# Patient Record
Sex: MALE | Race: WHITE | URBAN - METROPOLITAN AREA
[De-identification: names, ages, dates, MRNs, and addresses within clinical notes are randomized per-mention and may not be internally consistent; named-entity substitution may affect disease eponyms.]

---
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
## 2023-01-03 ENCOUNTER — OFFICE VISIT (OUTPATIENT)
Dept: PEDIATRIC ENDOCRINOLOGY | Age: 18
End: 2023-01-03
Payer: COMMERCIAL

## 2023-01-03 VITALS
RESPIRATION RATE: 18 BRPM | HEIGHT: 73 IN | DIASTOLIC BLOOD PRESSURE: 77 MMHG | TEMPERATURE: 97.7 F | BODY MASS INDEX: 25.87 KG/M2 | HEART RATE: 81 BPM | WEIGHT: 195.2 LBS | SYSTOLIC BLOOD PRESSURE: 127 MMHG | OXYGEN SATURATION: 97 %

## 2023-01-03 DIAGNOSIS — E55.9 VITAMIN D INSUFFICIENCY: ICD-10-CM

## 2023-01-03 DIAGNOSIS — E10.9 TYPE 1 DIABETES MELLITUS WITHOUT COMPLICATION (HCC): Primary | ICD-10-CM

## 2023-01-03 DIAGNOSIS — R53.83 FATIGUE, UNSPECIFIED TYPE: ICD-10-CM

## 2023-01-03 LAB — HBA1C MFR BLD HPLC: 7.2 %

## 2023-01-03 PROCEDURE — 3051F HG A1C>EQUAL 7.0%<8.0%: CPT | Performed by: STUDENT IN AN ORGANIZED HEALTH CARE EDUCATION/TRAINING PROGRAM

## 2023-01-03 PROCEDURE — 83036 HEMOGLOBIN GLYCOSYLATED A1C: CPT | Performed by: STUDENT IN AN ORGANIZED HEALTH CARE EDUCATION/TRAINING PROGRAM

## 2023-01-03 PROCEDURE — 99215 OFFICE O/P EST HI 40 MIN: CPT | Performed by: STUDENT IN AN ORGANIZED HEALTH CARE EDUCATION/TRAINING PROGRAM

## 2023-01-03 NOTE — PROGRESS NOTES
Chief Complaint   Patient presents with    Follow-up    Thyroid Problem    Diabetes     Patient stated that he has been feeling very fatigue, loss of appetite and upset GI- wondering if thyroid related. Patient stated he has been feeling down nearly everyday of the past 2 weeks- patient does see outside therapy services. Informed patient of services and support we offer here in the office, if needed.

## 2023-01-03 NOTE — LETTER
1/3/2023    Patient: Gutierrez Alba   YOB: 2005   Date of Visit: 1/3/2023     Dieter Barron MD  [address] [place] [address]  Via Fax: [phone]    Dear Dieter Barron MD,      Thank you for referring Mr. Chapin Martinez to [address] [place] [address] for evaluation. My notes for this consultation are attached. Chief Complaint   Patient presents with    Follow-up    Thyroid Problem    Diabetes     Patient stated that he has been feeling very fatigue, loss of appetite and upset GI- wondering if thyroid related. Patient stated he has been feeling down nearly everyday of the past 2 weeks- patient does see outside therapy services. Informed patient of services and support we offer here in the office, if needed. Type 1  DM on T slim insulin pump here for follow up  Also has hypothyroidism   Vitamin D deficiency    History of present illness:    Traci Garcia is a 21 y.o. male with with type 1 DM here for follow up  . He was present today with his parents. Traci Garcia was originally diagnosed with diabetes in 2/2018 when he presented in DKA to [facility] He was initially followed at [facility]. Hba1c at diagnosis was 11.1%. He was diagnosed with hypothyroidism on 4/30/18 with TSH of 11.41,freeT4 of 0.93. He was started on levothyroxine 44mcg daily. He is also s/p cholecalciferol 50,000units for vitamin D deficiency. Started T slim insulin pump on 5/7/2018. Last clinic visit was 9/30/2022 and Hba1c was 7.5 %. Reports tiredness, constipation and occasional diarrhea. Also reports low mood for which he is currently seeing an outside therapist.  Sees a therapist once a week. Aside these, he has been well with no ER visit, major illness or admission in the hospital. Had zero episodes of positive urine ketones.  Denies headache,tiredness, problems with peripheral vision,constipation/diarrhea,heat/cold intolerance,polyuria,polydipsia        He is on the Tslim and DEXCOM G6.     BG average for past 2weeks: 197. Time in range: 57 %, high: 42 %, below target: 1 %. See scanned chart.  Hypoglycemia : None recently    Insulin regimen:    Insulin regimen:  Basal                        12am 1.5                        4am   1.3                       7am 1.45                        4pm   1.4                        8pm 1.5  Total basal: 34.6units     Bolus                        Target 12am 110 mg/dl                                              4am 110 mg/dl                                              12pm 110 mg/dl                                                 8pm: 110                           ISF                 12am 1: 30                                              4am 1:30                                              12pm 1:30                                                8pm 1:30                           ICR                12am 1: 20                                              4am  1:12                                              7am 1:10                                                4pm  1:10                                                8pm  1:10        Hypothyroidism:   Most recent thyroid studies:  Office Visit on 01/03/2023   Component Date Value Ref Range Status    Hemoglobin A1c (POC) 01/03/2023 7.2  % Final   Orders Only on 10/12/2022   Component Date Value Ref Range Status    T4, Free 11/23/2022 0.86 (A)  0.93 - 1.60 ng/dL Final    TSH 11/23/2022 19.100 (A)  0.450 - 4.500 uIU/mL Final   Office Visit on 09/30/2022   Component Date Value Ref Range Status    Hemoglobin A1c (POC) 09/30/2022 7.5  % Final    T4, Free 09/30/2022 0.98  0.93 - 1.60 ng/dL Final    TSH 09/30/2022 6.150 (A)  0.450 - 4.500 uIU/mL Final    VITAMIN D, 25-HYDROXY 09/30/2022 24.3 (A)  30.0 - 100.0 ng/mL Final    Comment: Vitamin D deficiency has been defined by the [place] of  Medicine and an Endocrine Society practice guideline as a  level of serum 25-OH vitamin D less than 20 ng/mL (1,2). The Endocrine Society went on to further define vitamin D  insufficiency as a level between 21 and 29 ng/mL (2). 1. IOM ([place] of Medicine). 2010. Dietary reference     intakes for calcium and D. [facility]: The     [employer] CONS[employer]MENT. 2. Edvin MF, Bethel MCKEON, Vicky PULIDO, et al.     Evaluation, treatment, and prevention of vitamin D     deficiency: an Endocrine Society clinical practice     guideline. JCEM. 2011 Jul; 96(7):1911-30.  Creatinine, urine random 09/30/2022 107.6  Not Estab. mg/dL Final    Microalbumin, urine 09/30/2022 <3.0  Not Estab. ug/mL Final    Microalb/Creat ratio (ug/mg creat.) 09/30/2022 <3  0 - 29 mg/g creat Final    Comment:                        Normal:                0 -  29                         Moderately increased: 30 - 300                         Severely increased:       >300          Most recent labs done in October 2022 came back of elevated TSH with low free T4. Levothyroxine dose was increased from 112 mg daily to 125 mcg daily. Last Eye exam: Reports he had eye exam done recently    Last flu shot: Received in clinic [9/30/2022]    Medical ID: Wearing today    Received Covid vaccine. Screening labs:    Social History:  Second year of college at [employer]  Activities: soccer(goalie)    Review of systems:  12 point ROS completed by me and is negative except as indicated above in HPI    Medications:  Current Outpatient Medications   Medication Sig    levothyroxine (SYNTHROID) 125 mcg tablet Take 1 Tablet by mouth Daily (before breakfast). Do not take with calcium, iron or soy    insulin lispro (HumaLOG U-100 Insulin) 100 unit/mL injection INFUSE VIA INSULIN PUMP UP  UNITS DAILY.     Dexcom G6 Sensor will TO CHECK BLOOD SUGAR, CHANGE EVERY 7 DAYS 90-DAY SUPPLY    Dexcom G6 Transmitter wlil TO BE USED TO CHECK BLOOD SUGARS WITH DEXCOM SENSORS CHANGE EVERY 90 DAYS    glucagon (Baqsimi) 3 mg/actuation nasal spray BAQSIMI : two pack-3mg. Spray one device(3mg) in one nostril for severe hypoglycemia, LOC, seizures. Please label both packs.  OneTouch Verio test strips strip Test blood sugar up to 6x daily    Ketostix strip CHECK URINE FOR KETONES IF BLOOD SUGAR CONSISTENTLY ABOVE 300. ONE FOR HOME, ONE FOR SCHOOL.  insulin syringe-needle U-100 (BD INSULIN SYRINGE ULTRA-FINE) 0.3 mL 31 gauge x 15/64\" syrg 1-10 Units by Does Not Apply route six (6) times daily.  ONE TOUCH DELICA 33 gauge misc USE TO CHECK BLOOD SUGAR BEFORE MEALS, AT BEDTIME AND AS NEEDED    JADE PEN NEEDLE 32 gauge x 5/32\" ndle USE TO ADMINSTER INSULIN UP TO 4-5 TIMES PER DAY    insulin glargine (BASAGLAR KWIKPEN U-100 INSULIN) 100 unit/mL (3 mL) inpn Use as directed upto 50units daily (Patient not taking: No sig reported)     No current facility-administered medications for this visit. Allergies:  No Known Allergies        Objective:       Visit Vitals  /77 (BP 1 Location: Right arm, BP Patient Position: Sitting)   Pulse 81   Temp 97.7 °F (36.5 °C) (Temporal)   Resp 18   Ht 6' 0.76\" (1.848 m)   Wt 195 lb 3.2 oz (88.5 kg)   SpO2 97%   BMI 25.93 kg/m²     Blood pressure percentiles are not available for patients who are 18 years or older. Weight: 91 %ile (Z= 1.33) based on CDC (Boys, 2-20 Years) weight-for-age data using vitals from 1/3/2023. Height: 87 %ile (Z= 1.14) based on CDC (Boys, 2-20 Years) Stature-for-age data based on Stature recorded on 1/3/2023. BMI: Body mass index is 25.93 kg/m². Percentile: 82 %ile (Z= 0.90) based on CDC (Boys, 2-20 Years) BMI-for-age based on BMI available as of 1/3/2023. Change in weight: +2.7 kg in the last 3 months  Change in height: Relatively unchanged in the last 3 months    In general, Nico is alert, well-appearing and in no acute distress.   Oropharynx is clear, mucous membranes moist. Neck is supple without lymphadenopathy. Thyroid is smooth and not enlarged. Abdomen is soft, nontender, nondistended, no hepatosplenomegaly. Skin is warm and well perfused. Infusion sites:  clear without evidence of lipohypertrophy. Neuro demonstrates normal tone and strength throughout. Sexual development: stage [adult]    Laboratory data:  No components found for: [id number]  Recent Results (from the past 12 hour(s))   AMB POC HEMOGLOBIN A1C    Collection Time: 01/03/23 11:03 AM   Result Value Ref Range    Hemoglobin A1c (POC) 7.2 %         Office Visit on 01/03/2023   Component Date Value Ref Range Status    Hemoglobin A1c (POC) 01/03/2023 7.2  % Final   Orders Only on 10/12/2022   Component Date Value Ref Range Status    T4, Free 11/23/2022 0.86 (A)  0.93 - 1.60 ng/dL Final    TSH 11/23/2022 19.100 (A)  0.450 - 4.500 uIU/mL Final   Office Visit on 09/30/2022   Component Date Value Ref Range Status    Hemoglobin A1c (POC) 09/30/2022 7.5  % Final    T4, Free 09/30/2022 0.98  0.93 - 1.60 ng/dL Final    TSH 09/30/2022 6.150 (A)  0.450 - 4.500 uIU/mL Final    VITAMIN D, 25-HYDROXY 09/30/2022 24.3 (A)  30.0 - 100.0 ng/mL Final    Comment: Vitamin D deficiency has been defined by the [place] of  Medicine and an Endocrine Society practice guideline as a  level of serum 25-OH vitamin D less than 20 ng/mL (1,2). The Endocrine Society went on to further define vitamin D  insufficiency as a level between 21 and 29 ng/mL (2). 1. IOM ([place] of Medicine). 2010. Dietary reference     intakes for calcium and D. [facility]: The     [employer]. 2. Edvin MF, Bethel MCKEON, Vicky PULIDO, et al.     Evaluation, treatment, and prevention of vitamin D     deficiency: an Endocrine Society clinical practice     guideline. JCEM. 2011 Jul; 96(7):1911-30.       Creatinine, urine random 09/30/2022 107.6  Not Estab. mg/dL Final    Microalbumin, urine 09/30/2022 <3.0  Not Estab. ug/mL Final    Microalb/Creat ratio (ug/mg creat.) 09/30/2022 <3  0 - 29 mg/g creat Final    Comment:                        Normal:                0 -  29                         Moderately increased: 30 - 300                         Severely increased:       >300               Assessment:       Yusuf Garza is a 21 y.o. male presenting with recent diagnosis of type 1 diabetes under excellent control. Hba1c today 7.2 %, decreased from last clinic visit and within the ADA target of <7.5%. Started T-slim insulin pump on 5/7/2018. Using control IQ. BG averages improving and almost within target. No insulin dose changes today. Below is changing his pump site every 5 days. Stressed importance of changing pump site at least every 3 days to improve the effectiveness of insulin as well as decrease the risk of infection. Send me records in 2 weeks to review for any further insulin dose adjustment. Let me know sooner if he is having lows. Hypothyroidism: Most recent thyroid studies done in October 2022 came back of elevated TSH with low free T4. Levothyroxine dose was increased from 112 mcg daily to 125 mg daily. Reports tiredness, constipation and diarrhea. We will send repeat thyroid labs in a week together with a.m. cortisol, celiac screen and vitamin D level. We will give family a call to discuss the results as well as further management plan. Meantime continue levothyroxine 125 mcg daily. Low mood: Continue follow-up with outside therapist.    Yearly screening labs done in June 2022 came back with lipid panel significant for elevated total cholesterol, LDL and triglycerides. Stressed the importance of making dietary and lifestyle changes. Reduce sugary drinks, reduce carbs, reduce chips and cookies, increase vegetables, increase activity. Plan will be to repeat lipid panel in 3 months or sooner if any concerns. Medical ID recommended at all times.        Plan:   Reviewed growth charts and labs with family  Reviewed hypoglycemia and how to manage hypoglycemia including when to use glucagon (for severe hypoglycemia, LOC,seizure)  Reviewed ketones check and how to management positve ketones with family  Hemoglobin A1C reviewed. Correlation between A1C and long term complications like neuropathy, nephropathy and retinopathy reviewed. Acute complications like diabetes ketoacidosis and dehydration and electrolyte abnormalities discussed  Return to clinic in 3 months or sooner if any concerns    Patient Instructions   Seen for follow for type 1 diabetes. HbA1c today is 7.2 %. Target is <7.5%. Plan  Send us records in a week to review for any insulin dose adjustements  Review checking ketones when vomiting, 2 consecutive blood glucose above 350,  illness  When trace or small drink more water and keep checking until negative.  If moderate or large give us a call #[phone]  Target before activity >120, if below get something with carbs,protein and fat (granula bar)      Yearly eye exams are recommended after you have had diabetes for 3-5 years  Dental exams every 6 months are recommended  Flu vaccine is recommended every year, as early in the season as possible  Medical ID should be worn at all times  Continue rotating injection/insertion sites  Annual labs are due: 6/2023          Insulin regimen:  Basal                        12am 1.5                        4am   1.3                       7am 1.45                        4pm   1.4                        8pm 1.5  Total basal: 34.6units     Bolus                        Target 12am 110 mg/dl                                              4am 110 mg/dl                                              12pm 110 mg/dl                                                 8pm: 110                           ISF                 12am 1: 30                                              4am 1:30                                              12pm 1:30 8pm 1:30                           ICR                12am 1: 20                                              4am  1:12                                              7am 1:10                                                4pm  1:10                                                8pm  1:10     - Take Levothyroxine 125 mcg daily  - Take levothyroxine on an empty stomach if possible, about 30 minutes or more prior to breakfast or 2-3 hours after a meal  - Do not take levothyroxine with other medications such as vitamins, iron or calcium supplements as iron, calcium and soy can interfere with absorption of levothyroxine.  - If [facility] misses a dose of levothyroxine, it can be made up by taking it later in the day or by taking 2 doses the following day. - Repeat TSH and Free T4 today and in 3 months. - Return to endocrine clinic in 3 months.  - Call us sooner if [facility] has symptoms of tremor, persistently rapid heart beat, diarrhea, feeling too warm all the time, difficulty sleeping or difficulty concentrating as these can be symptoms of too much thyroid hormone and we may want to repeat labs sooner than the next scheduled time. - Thyroid hormone needs often increase with time as children grow, gain weight, and go through puberty, so dose may need to change over time.   Orders Placed This Encounter    CORTISOL, URINE FREE 24 HR     Standing Status:   Future     Number of Occurrences:   1     Standing Expiration Date:   1/3/2024    VITAMIN D, 25 HYDROXY     Standing Status:   Future     Number of Occurrences:   1     Standing Expiration Date:   1/3/2024    REFERRAL TO OPHTHALMOLOGY     Referral Priority:   Routine     Referral Type:   Consultation     Referral Reason:   Specialty Services Required     Number of Visits Requested:   1    AMB POC HEMOGLOBIN A1C         Total time: 40minutes  Time spent counseling patient/family: 50%    Parts of these notes were done by Dragon dictation and may be subject to inadvertent grammatical errors due to issues of voice recognition. Mariely Horner MD        [facility] Encounter:      Met with mother and Olinda Public. Home from [address] [place] [address], returns 1/25/23. Doing well with diabetes management. Concerns regarding thyroid management to be discussed with Dr Tan Arroyo. A lot of basal surges on CIQ Tandem. Time of sleep scheduled changed to [id number]. He is not changing the site every 3 days, changing every 3-6 days. Asked that he complete full change. He does take off pump for 4 hour at a time with soccer. Does suspend to prevent lows and eats to prevent lows. Asked that he send us data after being back to school for 3-4 weeks for review and support. Dr Yudi Nassar updated. If you have questions, please do not hesitate to call me. I look forward to following your patient along with you.       Sincerely,    Mariely Horner MD

## 2023-01-03 NOTE — PATIENT INSTRUCTIONS
Seen for follow for type 1 diabetes. HbA1c today is 7.2 %. Target is <7.5%. Plan  Send us records in a week to review for any insulin dose adjustements  Review checking ketones when vomiting, 2 consecutive blood glucose above 350,  illness  When trace or small drink more water and keep checking until negative.  If moderate or large give us a call #[phone]  Target before activity >120, if below get something with carbs,protein and fat (granula bar)      Yearly eye exams are recommended after you have had diabetes for 3-5 years  Dental exams every 6 months are recommended  Flu vaccine is recommended every year, as early in the season as possible  Medical ID should be worn at all times  Continue rotating injection/insertion sites  Annual labs are due: 6/2023          Insulin regimen:  Basal                        12am 1.5                        4am   1.3                       7am 1.45                        4pm   1.4                        8pm 1.5  Total basal: 34.6units     Bolus                        Target 12am 110 mg/dl                                              4am 110 mg/dl                                              12pm 110 mg/dl                                                 8pm: 110                           ISF                 12am 1: 30                                              4am 1:30                                              12pm 1:30                                                8pm 1:30                           ICR                12am 1: 20                                              4am  1:12                                              7am 1:10                                                4pm  1:10                                                8pm  1:10     - Take Levothyroxine 125 mcg daily  - Take levothyroxine on an empty stomach if possible, about 30 minutes or more prior to breakfast or 2-3 hours after a meal  - Do not take levothyroxine with other medications such as vitamins, iron or calcium supplements as iron, calcium and soy can interfere with absorption of levothyroxine.  - If [facility] misses a dose of levothyroxine, it can be made up by taking it later in the day or by taking 2 doses the following day. - Repeat TSH and Free T4 today and in 3 months. - Return to endocrine clinic in 3 months.  - Call us sooner if [facility] has symptoms of tremor, persistently rapid heart beat, diarrhea, feeling too warm all the time, difficulty sleeping or difficulty concentrating as these can be symptoms of too much thyroid hormone and we may want to repeat labs sooner than the next scheduled time. - Thyroid hormone needs often increase with time as children grow, gain weight, and go through puberty, so dose may need to change over time.

## 2023-01-03 NOTE — PROGRESS NOTES
[facility] Encounter:      Met with mother and Annemariechay Noonan. Home from [address] [place] [address], returns 1/25/23. Doing well with diabetes management. Concerns regarding thyroid management to be discussed with Dr Carlos Zavala. A lot of basal surges on CIQ Tandem. Time of sleep scheduled changed to [id number]. He is not changing the site every 3 days, changing every 3-6 days. Asked that he complete full change. He does take off pump for 4 hour at a time with soccer. Does suspend to prevent lows and eats to prevent lows. Asked that he send us data after being back to school for 3-4 weeks for review and support. Dr Sabina Riddle updated.

## 2023-01-03 NOTE — PROGRESS NOTES
Type 1  DM on T slim insulin pump here for follow up  Also has hypothyroidism   Vitamin D deficiency    History of present illness:    Cherrie Hernández is a 21 y.o. male with with type 1 DM here for follow up  . He was present today with his parents. Cherrie Hernández was originally diagnosed with diabetes in 2/2018 when he presented in DKA to [facility] He was initially followed at [facility]. Hba1c at diagnosis was 11.1%. He was diagnosed with hypothyroidism on 4/30/18 with TSH of 11.41,freeT4 of 0.93. He was started on levothyroxine 44mcg daily. He is also s/p cholecalciferol 50,000units for vitamin D deficiency. Started T slim insulin pump on 5/7/2018. Last clinic visit was 9/30/2022 and Hba1c was 7.5 %. Reports tiredness, constipation and occasional diarrhea. Also reports low mood for which he is currently seeing an outside therapist.  Sees a therapist once a week. Aside these, he has been well with no ER visit, major illness or admission in the hospital. Had zero episodes of positive urine ketones. Denies headache,tiredness, problems with peripheral vision,constipation/diarrhea,heat/cold intolerance,polyuria,polydipsia        He is on the Tslim and DEXCOM G6.     BG average for past 2weeks: 197. Time in range: 57 %, high: 42 %, below target: 1 %. See scanned chart.  Hypoglycemia : None recently    Insulin regimen:    Insulin regimen:  Basal                        12am 1.5                        4am   1.3                       7am 1.45                        4pm   1.4                        8pm 1.5  Total basal: 34.6units     Bolus                        Target 12am 110 mg/dl                                              4am 110 mg/dl                                              12pm 110 mg/dl                                                 8pm: 110                           ISF                 12am 1: 30                                              4am 1:30 12pm 1:30                                                8pm 1:30                           ICR                12am 1: 20                                              4am  1:12                                              7am 1:10                                                4pm  1:10                                                8pm  1:10        Hypothyroidism:   Most recent thyroid studies:  Office Visit on 01/03/2023   Component Date Value Ref Range Status    Hemoglobin A1c (POC) 01/03/2023 7.2  % Final   Orders Only on 10/12/2022   Component Date Value Ref Range Status    T4, Free 11/23/2022 0.86 (A)  0.93 - 1.60 ng/dL Final    TSH 11/23/2022 19.100 (A)  0.450 - 4.500 uIU/mL Final   Office Visit on 09/30/2022   Component Date Value Ref Range Status    Hemoglobin A1c (POC) 09/30/2022 7.5  % Final    T4, Free 09/30/2022 0.98  0.93 - 1.60 ng/dL Final    TSH 09/30/2022 6.150 (A)  0.450 - 4.500 uIU/mL Final    VITAMIN D, 25-HYDROXY 09/30/2022 24.3 (A)  30.0 - 100.0 ng/mL Final    Comment: Vitamin D deficiency has been defined by the [address] practice guideline as a  level of serum 25-OH vitamin D less than 20 ng/mL (1,2). The Endocrine Society went on to further define vitamin D  insufficiency as a level between 21 and 29 ng/mL (2). 1. IOM ([place] of Medicine). 2010. Dietary reference     intakes for calcium and D. [address] [facility]: The     Ontuitive. 2. Edvin MF, Bethel MCKEON, Vicky PULIDO, et al.     Evaluation, treatment, and prevention of vitamin D     deficiency: an Endocrine Society clinical practice     guideline. JCEM. 2011 Jul; 96(7):1911-30.       Creatinine, urine random 09/30/2022 107.6  Not Estab. mg/dL Final    Microalbumin, urine 09/30/2022 <3.0  Not Estab. ug/mL Final    Microalb/Creat ratio (ug/mg creat.) 09/30/2022 <3  0 - 29 mg/g creat Final    Comment:                        Normal:                0 -  29 Moderately increased: 30 - 300                         Severely increased:       >300          Most recent labs done in October 2022 came back of elevated TSH with low free T4. Levothyroxine dose was increased from 112 mg daily to 125 mcg daily. Last Eye exam: Reports he had eye exam done recently    Last flu shot: Received in clinic [9/30/2022]    Medical ID: Wearing today    Received Covid vaccine. Screening labs:    Social History:  Second year of college at [employer]  Activities: soccer(goalie)    Review of systems:  12 point ROS completed by me and is negative except as indicated above in HPI    Medications:  Current Outpatient Medications   Medication Sig    levothyroxine (SYNTHROID) 125 mcg tablet Take 1 Tablet by mouth Daily (before breakfast). Do not take with calcium, iron or soy    insulin lispro (HumaLOG U-100 Insulin) 100 unit/mL injection INFUSE VIA INSULIN PUMP UP  UNITS DAILY. Dexcom G6 Sensor will TO CHECK BLOOD SUGAR, CHANGE EVERY 7 DAYS 90-DAY SUPPLY    Dexcom G6 Transmitter will TO BE USED TO CHECK BLOOD SUGARS WITH DEXCOM SENSORS CHANGE EVERY 90 DAYS    glucagon (Baqsimi) 3 mg/actuation nasal spray BAQSIMI : two pack-3mg. Spray one device(3mg) in one nostril for severe hypoglycemia, LOC, seizures. Please label both packs. OneTouch Verio test strips strip Test blood sugar up to 6x daily    Ketostix strip CHECK URINE FOR KETONES IF BLOOD SUGAR CONSISTENTLY ABOVE 300. ONE FOR HOME, ONE FOR SCHOOL. insulin syringe-needle U-100 (BD INSULIN SYRINGE ULTRA-FINE) 0.3 mL 31 gauge x 15/64\" syrg 1-10 Units by Does Not Apply route six (6) times daily.     ONE TOUCH DELICA 33 gauge misc USE TO CHECK BLOOD SUGAR BEFORE MEALS, AT BEDTIME AND AS NEEDED    JADE PEN NEEDLE 32 gauge x 5/32\" ndle USE TO ADMINSTER INSULIN UP TO 4-5 TIMES PER DAY    insulin glargine (BASAGLAR KWIKPEN U-100 INSULIN) 100 unit/mL (3 mL) inpn Use as directed upto 50units daily (Patient not taking: No sig reported)     No current facility-administered medications for this visit. Allergies:  No Known Allergies        Objective:       Visit Vitals  /77 (BP 1 Location: Right arm, BP Patient Position: Sitting)   Pulse 81   Temp 97.7 °F (36.5 °C) (Temporal)   Resp 18   Ht 6' 0.76\" (1.848 m)   Wt 195 lb 3.2 oz (88.5 kg)   SpO2 97%   BMI 25.93 kg/m²     Blood pressure percentiles are not available for patients who are 18 years or older. Weight: 91 %ile (Z= 1.33) based on CDC (Boys, 2-20 Years) weight-for-age data using vitals from 1/3/2023. Height: 87 %ile (Z= 1.14) based on CDC (Boys, 2-20 Years) Stature-for-age data based on Stature recorded on 1/3/2023. BMI: Body mass index is 25.93 kg/m². Percentile: 82 %ile (Z= 0.90) based on CDC (Boys, 2-20 Years) BMI-for-age based on BMI available as of 1/3/2023. Change in weight: +2.7 kg in the last 3 months  Change in height: Relatively unchanged in the last 3 months    In general, Nico is alert, well-appearing and in no acute distress. Oropharynx is clear, mucous membranes moist. Neck is supple without lymphadenopathy. Thyroid is smooth and not enlarged. Abdomen is soft, nontender, nondistended, no hepatosplenomegaly. Skin is warm and well perfused. Infusion sites:  clear without evidence of lipohypertrophy. Neuro demonstrates normal tone and strength throughout.  Sexual development: stage [adult]    Laboratory data:  No components found for: [id number]  Recent Results (from the past 12 hour(s))   AMB POC HEMOGLOBIN A1C    Collection Time: 01/03/23 11:03 AM   Result Value Ref Range    Hemoglobin A1c (POC) 7.2 %         Office Visit on 01/03/2023   Component Date Value Ref Range Status    Hemoglobin A1c (POC) 01/03/2023 7.2  % Final   Orders Only on 10/12/2022   Component Date Value Ref Range Status    T4, Free 11/23/2022 0.86 (A)  0.93 - 1.60 ng/dL Final    TSH 11/23/2022 19.100 (A)  0.450 - 4.500 uIU/mL Final   Office Visit on 09/30/2022 Component Date Value Ref Range Status    Hemoglobin A1c (POC) 09/30/2022 7.5  % Final    T4, Free 09/30/2022 0.98  0.93 - 1.60 ng/dL Final    TSH 09/30/2022 6.150 (A)  0.450 - 4.500 uIU/mL Final    VITAMIN D, 25-HYDROXY 09/30/2022 24.3 (A)  30.0 - 100.0 ng/mL Final    Comment: Vitamin D deficiency has been defined by the [address] practice guideline as a  level of serum 25-OH vitamin D less than 20 ng/mL (1,2). The Endocrine Society went on to further define vitamin D  insufficiency as a level between 21 and 29 ng/mL (2). 1. IOM ([place] of Medicine). 2010. Dietary reference     intakes for calcium and D. [address] [facility]: The     [email or web address]. 2. Edvin MF, Bethel NC, Vicky PULIDO, et al.     Evaluation, treatment, and prevention of vitamin D     deficiency: an Endocrine Society clinical practice     guideline. JCEM. 2011 Jul; 96(7):1911-30. Creatinine, urine random 09/30/2022 107.6  Not Estab. mg/dL Final    Microalbumin, urine 09/30/2022 <3.0  Not Estab. ug/mL Final    Microalb/Creat ratio (ug/mg creat.) 09/30/2022 <3  0 - 29 mg/g creat Final    Comment:                        Normal:                0 -  29                         Moderately increased: 30 - 300                         Severely increased:       >300               Assessment:       Elisha Lafleur is a 21 y.o. male presenting with recent diagnosis of type 1 diabetes under excellent control. Hba1c today 7.2 %, decreased from last clinic visit and within the ADA target of <7.5%. Started T-slim insulin pump on 5/7/2018. Using control IQ. BG averages improving and almost within target. No insulin dose changes today. Below is changing his pump site every 5 days. Stressed importance of changing pump site at least every 3 days to improve the effectiveness of insulin as well as decrease the risk of infection. Send me records in 2 weeks to review for any further insulin dose adjustment.  Let me know sooner if he is having lows. Hypothyroidism: Most recent thyroid studies done in October 2022 came back of elevated TSH with low free T4. Levothyroxine dose was increased from 112 mcg daily to 125 mg daily. Reports tiredness, constipation and diarrhea. We will send repeat thyroid labs in a week together with a.m. cortisol, celiac screen and vitamin D level. We will give family a call to discuss the results as well as further management plan. Meantime continue levothyroxine 125 mcg daily. Low mood: Continue follow-up with outside therapist.    Yearly screening labs done in June 2022 came back with lipid panel significant for elevated total cholesterol, LDL and triglycerides. Stressed the importance of making dietary and lifestyle changes. Reduce sugary drinks, reduce carbs, reduce chips and cookies, increase vegetables, increase activity. Plan will be to repeat lipid panel in 3 months or sooner if any concerns. Medical ID recommended at all times. Plan:   Reviewed growth charts and labs with family  Reviewed hypoglycemia and how to manage hypoglycemia including when to use glucagon (for severe hypoglycemia, LOC,seizure)  Reviewed ketones check and how to management positve ketones with family  Hemoglobin A1C reviewed. Correlation between A1C and long term complications like neuropathy, nephropathy and retinopathy reviewed. Acute complications like diabetes ketoacidosis and dehydration and electrolyte abnormalities discussed  Return to clinic in 3 months or sooner if any concerns    Patient Instructions   Seen for follow for type 1 diabetes. HbA1c today is 7.2 %. Target is <7.5%. Plan  Send us records in a week to review for any insulin dose adjustements  Review checking ketones when vomiting, 2 consecutive blood glucose above 350,  illness  When trace or small drink more water and keep checking until negative.  If moderate or large give us a call #[phone]  Target before activity >120, if below get something with carbs,protein and fat (granula bar)      Yearly eye exams are recommended after you have had diabetes for 3-5 years  Dental exams every 6 months are recommended  Flu vaccine is recommended every year, as early in the season as possible  Medical ID should be worn at all times  Continue rotating injection/insertion sites  Annual labs are due: 6/2023          Insulin regimen:  Basal                        12am 1.5                        4am   1.3                       7am 1.45                        4pm   1.4                        8pm 1.5  Total basal: 34.6units     Bolus                        Target 12am 110 mg/dl                                              4am 110 mg/dl                                              12pm 110 mg/dl                                                 8pm: 110                           ISF                 12am 1: 30                                              4am 1:30                                              12pm 1:30                                                8pm 1:30                           ICR                12am 1: 20                                              4am  1:12                                              7am 1:10                                                4pm  1:10                                                8pm  1:10     - Take Levothyroxine 125 mcg daily  - Take levothyroxine on an empty stomach if possible, about 30 minutes or more prior to breakfast or 2-3 hours after a meal  - Do not take levothyroxine with other medications such as vitamins, iron or calcium supplements as iron, calcium and soy can interfere with absorption of levothyroxine.  - If [facility] misses a dose of levothyroxine, it can be made up by taking it later in the day or by taking 2 doses the following day. - Repeat TSH and Free T4 today and in 3 months.   - Return to endocrine clinic in 3 months.  - Call us sooner if Cherrie Hernández has symptoms of tremor, persistently rapid heart beat, diarrhea, feeling too warm all the time, difficulty sleeping or difficulty concentrating as these can be symptoms of too much thyroid hormone and we may want to repeat labs sooner than the next scheduled time. - Thyroid hormone needs often increase with time as children grow, gain weight, and go through puberty, so dose may need to change over time. Orders Placed This Encounter    CORTISOL, URINE FREE 24 HR     Standing Status:   Future     Number of Occurrences:   1     Standing Expiration Date:   1/3/2024    VITAMIN D, 25 HYDROXY     Standing Status:   Future     Number of Occurrences:   1     Standing Expiration Date:   1/3/2024    REFERRAL TO OPHTHALMOLOGY     Referral Priority:   Routine     Referral Type:   Consultation     Referral Reason:   Specialty Services Required     Number of Visits Requested:   1    AMB POC HEMOGLOBIN A1C         Total time: 40minutes  Time spent counseling patient/family: 50%    Parts of these notes were done by Dragon dictation and may be subject to inadvertent grammatical errors due to issues of voice recognition.     Joceline Momin MD

## 2023-01-18 NOTE — PATIENT INSTRUCTIONS
Seen for follow for type 1 diabetes.  HbA1c today is 7.7 %. Target is <7.5%.         Plan  Send us records in a week to review for any insulin dose adjustements  Review checking ketones when vomiting, 2 consecutive blood glucose above 350,  illness  When trace or small drink more water and keep checking until negative.  If moderate or large give us a call #[phone]  Target before activity >120, if below get something with carbs,protein and fat (granula bar)      Yearly eye exams are recommended after you have had diabetes for 3-5 years  Dental exams every 6 months are recommended  Flu vaccine is recommended every year, as early in the season as possible  Medical ID should be worn at all times  Continue rotating injection/insertion sites  Annual labs are due: 6/2023        New insulin regimen    Insulin regimen:  Basal                        12am 1.3                        4am   1.3                                               12pm  1.35                        4pm   1.3                        8pm 1.3  Total basal: 31.7units     Bolus                        Target 12am 110 mg/dl                                              4am 110 mg/dl                                              12pm 110 mg/dl                                                 8pm: 110                           ISF                 12am 1:50                                              4am 1:30                                              12pm 1:30                                                8pm 1:30                           ICR                12am 1:40                                              4am  1:12                                              7am 1:10                                                12pm  1:10                                                8pm  1:15                         - Take Levothyroxine 112 mcg daily  - Take levothyroxine on an empty stomach if possible, about 30 minutes or more prior to breakfast or 2-3 hours after a meal  - Do not take levothyroxine with other medications such as vitamins, iron or calcium supplements as iron, calcium and soy can interfere with absorption of levothyroxine.  - If Nico misses a dose of levothyroxine, it can be made up by taking it later in the day or by taking 2 doses the following day. - Repeat TSH and Free T4 in 3 months. - Return to endocrine clinic in 3 months.  - Call us sooner if Nico has symptoms of tremor, persistently rapid heart beat, diarrhea, feeling too warm all the time, difficulty sleeping or difficulty concentrating as these can be symptoms of too much thyroid hormone and we may want to repeat labs sooner than the next scheduled time.   - Thyroid hormone needs often increase with time as children grow, gain weight, and go through puberty, so dose may need to change over time.        caffeine

## 2023-02-27 DIAGNOSIS — E03.9 HYPOTHYROIDISM (ACQUIRED): ICD-10-CM

## 2023-02-27 RX ORDER — LEVOTHYROXINE SODIUM 125 UG/1
TABLET ORAL
Qty: 90 TABLET | Refills: 0 | Status: SHIPPED | OUTPATIENT
Start: 2023-02-27

## 2023-06-25 ENCOUNTER — TELEPHONE (OUTPATIENT)
Age: 18
End: 2023-06-25

## 2023-06-25 DIAGNOSIS — E10.9 TYPE 1 DIABETES MELLITUS WITHOUT COMPLICATION (HCC): Primary | ICD-10-CM

## 2023-06-25 RX ORDER — INSULIN DEGLUDEC INJECTION 100 U/ML
INJECTION, SOLUTION SUBCUTANEOUS
Qty: 15 ML | Refills: 1 | Status: SHIPPED | OUTPATIENT
Start: 2023-06-25

## 2023-06-25 RX ORDER — INSULIN LISPRO-AABC 100 [IU]/ML
INJECTION, SOLUTION SUBCUTANEOUS
Qty: 15 ML | Refills: 1 | Status: SHIPPED | OUTPATIENT
Start: 2023-06-25

## 2023-07-10 RX ORDER — LEVOTHYROXINE SODIUM 0.12 MG/1
TABLET ORAL
Qty: 30 TABLET | Refills: 1 | Status: SHIPPED | OUTPATIENT
Start: 2023-07-10

## 2023-07-17 ENCOUNTER — OFFICE VISIT (OUTPATIENT)
Age: 18
End: 2023-07-17
Payer: COMMERCIAL

## 2023-07-17 VITALS
RESPIRATION RATE: 19 BRPM | HEART RATE: 84 BPM | DIASTOLIC BLOOD PRESSURE: 74 MMHG | WEIGHT: 196.38 LBS | SYSTOLIC BLOOD PRESSURE: 124 MMHG | HEIGHT: 73 IN | BODY MASS INDEX: 26.03 KG/M2 | OXYGEN SATURATION: 97 %

## 2023-07-17 DIAGNOSIS — E10.9 TYPE 1 DIABETES MELLITUS WITHOUT COMPLICATIONS (HCC): Primary | ICD-10-CM

## 2023-07-17 DIAGNOSIS — E03.9 HYPOTHYROIDISM (ACQUIRED): ICD-10-CM

## 2023-07-17 DIAGNOSIS — E10.9 TYPE 1 DIABETES MELLITUS WITHOUT COMPLICATION (HCC): ICD-10-CM

## 2023-07-17 DIAGNOSIS — E55.9 VITAMIN D INSUFFICIENCY: ICD-10-CM

## 2023-07-17 LAB — HBA1C MFR BLD: 7.1 %

## 2023-07-17 PROCEDURE — 99215 OFFICE O/P EST HI 40 MIN: CPT | Performed by: STUDENT IN AN ORGANIZED HEALTH CARE EDUCATION/TRAINING PROGRAM

## 2023-07-17 PROCEDURE — 83036 HEMOGLOBIN GLYCOSYLATED A1C: CPT | Performed by: STUDENT IN AN ORGANIZED HEALTH CARE EDUCATION/TRAINING PROGRAM

## 2023-07-17 RX ORDER — LEVOTHYROXINE SODIUM 0.12 MG/1
TABLET ORAL
Qty: 90 TABLET | Refills: 3 | Status: SHIPPED | OUTPATIENT
Start: 2023-07-17

## 2023-07-17 ASSESSMENT — PATIENT HEALTH QUESTIONNAIRE - PHQ9
SUM OF ALL RESPONSES TO PHQ QUESTIONS 1-9: 0
SUM OF ALL RESPONSES TO PHQ9 QUESTIONS 1 & 2: 0
2. FEELING DOWN, DEPRESSED OR HOPELESS: 0
1. LITTLE INTEREST OR PLEASURE IN DOING THINGS: 0
SUM OF ALL RESPONSES TO PHQ QUESTIONS 1-9: 0

## 2023-07-17 NOTE — PROGRESS NOTES
CDCES Encounter with Suma Hull for follow up of Type 1 Diabetes. Accompanied today by mom . Complete insulin delivery via: Tandem Control IQ  Insights from device download: Dexcom G6   Time in Range (  mg/dl): 55  TDD: 66      Schedule is different during summer, reports some days not doing well with entries. Discussed exercise mode and sleep for > 5 hours intervals. Forgot transmitter in [place], discussed cash pay options and [employer].        Hemoglobin A1C, POC   Date Value Ref Range Status   07/17/2023 7.1 % Final   04/14/2023 6.5 % Final   01/03/2023 7.2 % Final     Hemoglobin A1C   Date Value Ref Range Status   06/15/2022 7.8 (H) 4.8 - 5.6 % Final     Comment:              Prediabetes: 5.7 - 6.4           Diabetes: >6.4           Glycemic control for adults with diabetes: <7.0     07/08/2020 6.4 (H) 4.8 - 5.6 % Final     Comment:              Prediabetes: 5.7 - 6.4           Diabetes: >6.4           Glycemic control for adults with diabetes: <7.0              MIGUEL LONGO RN, Ama Rossi
Identified patient with two patient identifiers- name and . Reviewed record in preparation for visit and have obtained necessary documentation. Chief Complaint   Patient presents with    Diabetes        There were no vitals taken for this visit.
Insulin Lispro-aabc, 1 U Dial, (VANDANAUMKRISTANV KWIKPEN) 100 UNIT/ML SOPN Use as directed subcutaneous upto 100units daily(for pump failure)    Glucagon (BAQSIMI ONE PACK) 3 MG/DOSE POWD BAQSIMI : two pack-3mg. Spray one device(3mg) in one nostril for severe hypoglycemia, LOC, seizures. Please label both packs. insulin lispro (HUMALOG) 100 UNIT/ML SOLN injection vial INFUSE VIA INSULIN PUMP UP  UNITS DAILY. No current facility-administered medications for this visit. Allergies:  No Known Allergies          Objective:       Ht Readings from Last 3 Encounters:   07/17/23 6' 1.31\" (1.862 m) (91 %, Z= 1.32)*   01/03/23 6' 0.76\" (1.848 m) (87 %, Z= 1.14)*   09/30/22 6' 0.8\" (1.849 m) (88 %, Z= 1.17)*     * Growth percentiles are based on CDC (Boys, 2-20 Years) data. Wt Readings from Last 3 Encounters:   07/17/23 196 lb 6 oz (89.1 kg) (91 %, Z= 1.31)*   01/03/23 195 lb 3.2 oz (88.5 kg) (91 %, Z= 1.33)*   09/30/22 189 lb 3.2 oz (85.8 kg) (89 %, Z= 1.20)*     * Growth percentiles are based on CDC (Boys, 2-20 Years) data. Temp Readings from Last 3 Encounters:   No data found for Temp     BP Readings from Last 3 Encounters:   07/17/23 124/74   01/03/23 127/77   09/30/22 120/88     Pulse Readings from Last 3 Encounters:   07/17/23 84   01/03/23 81   09/30/22 86           In general, [facility] is alert, well-appearing and in no acute distress. Oropharynx is clear, mucous membranes moist. Neck is supple without lymphadenopathy. Thyroid is smooth and not enlarged. Abdomen is soft, nontender, nondistended, no hepatosplenomegaly. Skin is warm and well perfused. Infusion sites:  clear without evidence of lipohypertrophy. Neuro demonstrates normal tone and strength throughout.  Sexual development: stage [adult]    Laboratory data:  No components found for: [id number]  Recent Results (from the past 12 hour(s))   AMB POC HEMOGLOBIN A1C    Collection Time: 04/14/23  9:38 AM   Result Value Ref Range    Hemoglobin A1c

## 2023-07-17 NOTE — PATIENT INSTRUCTIONS
Seen for follow for type 1 diabetes. HbA1c today is 7.1%. Target is <7.0%. Plan  Send us records in a week to review for any insulin dose adjustements  Review checking ketones when vomiting, 2 consecutive blood glucose above 350,  illness  When trace or small drink more water and keep checking until negative.  If moderate or large give us a call #[phone]  Target before activity >120, if below get something with carbs,protein and fat (granula bar)      Yearly eye exams are recommended after you have had diabetes for 3-5 years  Dental exams every 6 months are recommended  Flu vaccine is recommended every year, as early in the season as possible  Medical ID should be worn at all times  Continue rotating injection/insertion sites  Annual labs are due: 6/2023          Insulin regimen:  Basal                        Basal                        12am 1.4                        4am   1.2                       7am 1.45                        4pm   1.45                        8pm 1.5  Total basal: 34.units     Bolus                        Target 12am 110 mg/dl                                              4am 110 mg/dl                                              12pm 110 mg/dl                                                 8pm: 110                           ISF                 12am 1: 30                                              4am 1:30                                              12pm 1:30                                                8pm 1:30                           ICR                12am 1: 20                                              4am  1:12                                              7am 1:10                                                4pm  1:10                                                8pm  1:10                            - Take Levothyroxine 125 mcg daily  - Take levothyroxine on an empty stomach if possible, about 30 minutes or more prior to breakfast or 2-3 hours after a meal  - Do

## 2023-07-24 ENCOUNTER — PATIENT MESSAGE (OUTPATIENT)
Age: 18
End: 2023-07-24

## 2023-08-14 RX ORDER — INSULIN LISPRO 100 [IU]/ML
INJECTION, SOLUTION INTRAVENOUS; SUBCUTANEOUS
Qty: 90 ML | Refills: 3 | Status: SHIPPED | OUTPATIENT
Start: 2023-08-14

## 2023-08-30 ENCOUNTER — PATIENT MESSAGE (OUTPATIENT)
Age: 18
End: 2023-08-30

## 2023-08-30 DIAGNOSIS — E10.9 TYPE 1 DIABETES MELLITUS WITHOUT COMPLICATION (HCC): ICD-10-CM

## 2023-08-30 RX ORDER — PROCHLORPERAZINE 25 MG/1
SUPPOSITORY RECTAL
Qty: 9 EACH | Refills: 0 | Status: SHIPPED | OUTPATIENT
Start: 2023-08-30

## 2023-08-30 RX ORDER — INSULIN LISPRO 100 [IU]/ML
INJECTION, SOLUTION INTRAVENOUS; SUBCUTANEOUS
Qty: 90 ML | Refills: 1 | Status: SHIPPED | OUTPATIENT
Start: 2023-08-30

## 2023-08-30 RX ORDER — PROCHLORPERAZINE 25 MG/1
SUPPOSITORY RECTAL
COMMUNITY
Start: 2023-06-30 | End: 2023-08-30 | Stop reason: SDUPTHER

## 2023-08-30 RX ORDER — PROCHLORPERAZINE 25 MG/1
SUPPOSITORY RECTAL
Qty: 1 EACH | Refills: 1 | Status: SHIPPED | OUTPATIENT
Start: 2023-08-30

## 2023-08-30 RX ORDER — LEVOTHYROXINE SODIUM 0.12 MG/1
TABLET ORAL
Qty: 90 TABLET | Refills: 1 | Status: SHIPPED | OUTPATIENT
Start: 2023-08-30

## 2023-10-03 ENCOUNTER — OFFICE VISIT (OUTPATIENT)
Age: 18
End: 2023-10-03
Payer: COMMERCIAL

## 2023-10-03 VITALS
SYSTOLIC BLOOD PRESSURE: 113 MMHG | RESPIRATION RATE: 17 BRPM | OXYGEN SATURATION: 97 % | BODY MASS INDEX: 25.25 KG/M2 | HEART RATE: 84 BPM | HEIGHT: 73 IN | WEIGHT: 190.5 LBS | DIASTOLIC BLOOD PRESSURE: 78 MMHG

## 2023-10-03 DIAGNOSIS — E03.9 HYPOTHYROIDISM (ACQUIRED): ICD-10-CM

## 2023-10-03 DIAGNOSIS — E10.9 TYPE 1 DIABETES MELLITUS WITHOUT COMPLICATION (HCC): ICD-10-CM

## 2023-10-03 DIAGNOSIS — E55.9 VITAMIN D DEFICIENCY: ICD-10-CM

## 2023-10-03 DIAGNOSIS — E10.9 TYPE 1 DIABETES MELLITUS WITHOUT COMPLICATION (HCC): Primary | ICD-10-CM

## 2023-10-03 LAB
HBA1C MFR BLD: 6.8 %
SPECIMEN HOLD: NORMAL

## 2023-10-03 PROCEDURE — G8420 CALC BMI NORM PARAMETERS: HCPCS | Performed by: STUDENT IN AN ORGANIZED HEALTH CARE EDUCATION/TRAINING PROGRAM

## 2023-10-03 PROCEDURE — 2022F DILAT RTA XM EVC RTNOPTHY: CPT | Performed by: STUDENT IN AN ORGANIZED HEALTH CARE EDUCATION/TRAINING PROGRAM

## 2023-10-03 PROCEDURE — 99215 OFFICE O/P EST HI 40 MIN: CPT | Performed by: STUDENT IN AN ORGANIZED HEALTH CARE EDUCATION/TRAINING PROGRAM

## 2023-10-03 PROCEDURE — 4004F PT TOBACCO SCREEN RCVD TLK: CPT | Performed by: STUDENT IN AN ORGANIZED HEALTH CARE EDUCATION/TRAINING PROGRAM

## 2023-10-03 PROCEDURE — 83036 HEMOGLOBIN GLYCOSYLATED A1C: CPT | Performed by: STUDENT IN AN ORGANIZED HEALTH CARE EDUCATION/TRAINING PROGRAM

## 2023-10-03 PROCEDURE — G8484 FLU IMMUNIZE NO ADMIN: HCPCS | Performed by: STUDENT IN AN ORGANIZED HEALTH CARE EDUCATION/TRAINING PROGRAM

## 2023-10-03 PROCEDURE — 3046F HEMOGLOBIN A1C LEVEL >9.0%: CPT | Performed by: STUDENT IN AN ORGANIZED HEALTH CARE EDUCATION/TRAINING PROGRAM

## 2023-10-03 PROCEDURE — G8427 DOCREV CUR MEDS BY ELIG CLIN: HCPCS | Performed by: STUDENT IN AN ORGANIZED HEALTH CARE EDUCATION/TRAINING PROGRAM

## 2023-10-03 RX ORDER — PROCHLORPERAZINE 25 MG/1
SUPPOSITORY RECTAL
Qty: 9 EACH | Refills: 0 | Status: SHIPPED | OUTPATIENT
Start: 2023-10-03

## 2023-10-03 RX ORDER — GLUCAGON 3 MG/1
POWDER NASAL
Qty: 1 EACH | Refills: 0 | Status: SHIPPED | OUTPATIENT
Start: 2023-10-03

## 2023-10-03 RX ORDER — PROCHLORPERAZINE 25 MG/1
SUPPOSITORY RECTAL
Qty: 1 EACH | Refills: 1 | Status: SHIPPED | OUTPATIENT
Start: 2023-10-03

## 2023-10-03 ASSESSMENT — PATIENT HEALTH QUESTIONNAIRE - PHQ9
2. FEELING DOWN, DEPRESSED OR HOPELESS: 0
SUM OF ALL RESPONSES TO PHQ QUESTIONS 1-9: 0
SUM OF ALL RESPONSES TO PHQ QUESTIONS 1-9: 0
SUM OF ALL RESPONSES TO PHQ9 QUESTIONS 1 & 2: 0
1. LITTLE INTEREST OR PLEASURE IN DOING THINGS: 0
SUM OF ALL RESPONSES TO PHQ QUESTIONS 1-9: 0
SUM OF ALL RESPONSES TO PHQ QUESTIONS 1-9: 0

## 2023-10-03 NOTE — PATIENT INSTRUCTIONS
us sooner if Vinnie Chambers has symptoms of tremor, persistently rapid heart beat, diarrhea, feeling too warm all the time, difficulty sleeping or difficulty concentrating as these can be symptoms of too much thyroid hormone and we may want to repeat labs sooner than the next scheduled time. - Thyroid hormone needs often increase with time as children grow, gain weight, and go through puberty, so dose may need to change over time.

## 2023-10-03 NOTE — PROGRESS NOTES
CDCES met with Ct Agata and mother, doing well. Discussed Tandem CIQ. Using activity mode a lot.  Happy with settings at this time    New Rx placed for Dr Blanche Landry to sign
Chief Complaint   Patient presents with    Follow-up    Diabetes
25 Hydroxy     Standing Status:   Future     Number of Occurrences:   1     Standing Expiration Date:   1/3/2024    AMB POC HEMOGLOBIN A1C         Total time: 40minutes  Time spent counseling patient/family: 50%    Parts of these notes were done by Dragon dictation and may be subject to inadvertent grammatical errors due to issues of voice recognition.     Rosamaria Mars MD

## 2023-10-04 ENCOUNTER — TELEPHONE (OUTPATIENT)
Age: 18
End: 2023-10-04

## 2023-10-04 DIAGNOSIS — E55.9 VITAMIN D INSUFFICIENCY: Primary | ICD-10-CM

## 2023-10-04 LAB
25(OH)D3 SERPL-MCNC: 23.6 NG/ML (ref 30–100)
CHOLEST SERPL-MCNC: 182 MG/DL
CREAT UR-MCNC: 424 MG/DL
EST. AVERAGE GLUCOSE BLD GHB EST-MCNC: 140 MG/DL
HBA1C MFR BLD: 6.5 % (ref 4–5.6)
HDLC SERPL-MCNC: 48 MG/DL
HDLC SERPL: 3.8 (ref 0–5)
LDLC SERPL CALC-MCNC: 110.2 MG/DL (ref 0–100)
MICROALBUMIN UR-MCNC: 1.63 MG/DL
MICROALBUMIN/CREAT UR-RTO: 4 MG/G (ref 0–30)
T4 FREE SERPL-MCNC: 1 NG/DL (ref 0.8–1.5)
TRIGL SERPL-MCNC: 119 MG/DL
TSH SERPL DL<=0.05 MIU/L-ACNC: 4.67 UIU/ML (ref 0.36–3.74)
VLDLC SERPL CALC-MCNC: 23.8 MG/DL

## 2023-10-04 NOTE — TELEPHONE ENCOUNTER
Regarding: Trying not to panic  Contact: [phone]  ----- Message from [facility], LPN sent at 48/3/7371  8:46 AM EDT -----       ----- Message from MEENU to Bri Sanchez MD sent at 10/4/2023  8:46 AM -----   Johnny Boo)    I'm sure Dr. Ollie Vizcarra. will call this morning but I couldn't wait. Robb wasn't wrong. His thyroid is off, but obviously as you will see, that's not what has me spun out. That creatinine level is literally off the charts. Of course at 2am, Dr. Malissa Rothman has me thinking he's in kidney failure. I'm hoping something went wrong and this is just a retest situation. Let me know what you think. Thanks!       Rachell Leroy  [phone]

## 2023-10-04 NOTE — TELEPHONE ENCOUNTER
Called and spoke to mom. Reviewed urine microalbumin to creatinine ratio and what the various numbers mean. [facility] abnormal urine microalbumin to creatinine ratio. Mom verbalized understanding. Also reviewed the other screening labs. Mildly elevated TSH with normal free T4. Plan will be to increase levothyroxine dose to 125 mg daily for 6 days. He will take 1-1/2 tablets on Sunday. Total daily dose 133 mcg daily. He also had insufficient vitamin D level. We will start him on cholecalciferol 2000 units daily. Lipid panel improving.

## 2023-11-13 NOTE — PROGRESS NOTES
Chief Complaint   Patient presents with    Follow-up    Diabetes Kendra Munson  Cardiovascular Disease  [address] [place] [id number]  Phone: [phone]  Fax: [phone]  Established Patient  Follow Up Time:

## 2023-11-30 ENCOUNTER — PATIENT MESSAGE (OUTPATIENT)
Age: 18
End: 2023-11-30

## 2023-11-30 DIAGNOSIS — E10.9 TYPE 1 DIABETES MELLITUS WITHOUT COMPLICATION (HCC): ICD-10-CM

## 2023-12-01 DIAGNOSIS — E55.9 VITAMIN D INSUFFICIENCY: ICD-10-CM

## 2023-12-01 DIAGNOSIS — E03.9 HYPOTHYROIDISM (ACQUIRED): ICD-10-CM

## 2023-12-01 DIAGNOSIS — E55.9 VITAMIN D INSUFFICIENCY: Primary | ICD-10-CM

## 2023-12-01 DIAGNOSIS — E03.9 HYPOTHYROIDISM (ACQUIRED): Primary | ICD-10-CM

## 2023-12-01 RX ORDER — PROCHLORPERAZINE 25 MG/1
SUPPOSITORY RECTAL
Qty: 9 EACH | Refills: 0 | Status: SHIPPED | OUTPATIENT
Start: 2023-12-01

## 2023-12-01 NOTE — TELEPHONE ENCOUNTER
From: Mena Holland  To: Dr. Park Prim: 11/30/2023 7:27 PM EST  Subject: Dexcom refill    Good morning Endo team!    Corbin Hayes is requesting a refill on his Dexcom sensors. If [facility] can [facility] [place], that would be awesome. Thanks so much!     Manuel Baxter  [phone]

## 2023-12-19 LAB
25(OH)D3+25(OH)D2 SERPL-MCNC: 33.8 NG/ML (ref 30–100)
T4 FREE SERPL-MCNC: 1.89 NG/DL (ref 0.82–1.77)
TSH SERPL DL<=0.005 MIU/L-ACNC: 0.36 UIU/ML (ref 0.45–4.5)

## 2024-01-07 DIAGNOSIS — E55.9 VITAMIN D INSUFFICIENCY: ICD-10-CM

## 2024-01-09 RX ORDER — CHOLECALCIFEROL (VITAMIN D3) 125 MCG
1 CAPSULE ORAL DAILY
Qty: 90 TABLET | Refills: 1 | Status: SHIPPED | OUTPATIENT
Start: 2024-01-09

## 2024-01-18 ENCOUNTER — PATIENT MESSAGE (OUTPATIENT)
Age: 19
End: 2024-01-18

## 2024-01-19 NOTE — TELEPHONE ENCOUNTER
From: [facility] Sung Holland  To: Dr. Kash Mary  Sent: 1/18/2024 9:13 PM EST  Subject: Rx question    Good morning Endo team,    This may or may not be an off the wall question. [facility] has been seeing a therapist for over 1.5 years and they've decided together that now is the time for some meds to help with his anxiety. This will be his first experience with any meds outside of everything he is on with you guys.     My first question is how involved does Dr. Mary get with this? Can/does he prescribe the med (Wellbutrin) that his therapist is recommending?     We just want to make sure if he's going down this road that he does it the right way with the right drug and I'm not sure if Dr. Tim has experience or recommendations that may or may not interact with a T1D patient that we should be aware of.     Let me know.    Thanks so much and have a great day!    Sarah  [phone]

## 2024-02-08 DIAGNOSIS — E03.9 HYPOTHYROIDISM (ACQUIRED): ICD-10-CM

## 2024-02-16 DIAGNOSIS — E55.9 VITAMIN D INSUFFICIENCY: Primary | ICD-10-CM

## 2024-02-16 DIAGNOSIS — R53.83 FATIGUE, UNSPECIFIED TYPE: ICD-10-CM

## 2024-02-16 DIAGNOSIS — E55.9 VITAMIN D INSUFFICIENCY: ICD-10-CM

## 2024-02-16 DIAGNOSIS — E03.9 HYPOTHYROIDISM (ACQUIRED): ICD-10-CM

## 2024-02-20 ENCOUNTER — TELEPHONE (OUTPATIENT)
Age: 19
End: 2024-02-20

## 2024-02-21 LAB
25(OH)D3+25(OH)D2 SERPL-MCNC: 47.1 NG/ML (ref 30–100)
ACTH PLAS-MCNC: 16.2 PG/ML (ref 7.2–63.3)
CORTIS AM PEAK SERPL-MCNC: 10.2 UG/DL (ref 6.2–19.4)

## 2024-02-22 LAB
T4 FREE SERPL-MCNC: 1.38 NG/DL (ref 0.82–1.77)
TSH SERPL DL<=0.005 MIU/L-ACNC: 3.15 UIU/ML (ref 0.45–4.5)

## 2024-02-28 ENCOUNTER — PATIENT MESSAGE (OUTPATIENT)
Age: 19
End: 2024-02-28

## 2024-02-29 RX ORDER — ACYCLOVIR 400 MG/1
TABLET ORAL
Qty: 3 EACH | Refills: 3 | Status: SHIPPED | OUTPATIENT
Start: 2024-02-29

## 2024-02-29 NOTE — TELEPHONE ENCOUNTER
From: [facility] Sung Holland  To: Dr. Kash Mary  Sent: 2/28/2024 8:18 PM EST  Subject: G7    Hello Endo Team!    [facility] is requesting a new Rx for the G7 sensor. He just used his last G6. He’s up at school now so that pharmacy in [place] would be great.     Thanks so much!     Sarah

## 2024-03-01 ENCOUNTER — TELEPHONE (OUTPATIENT)
Age: 19
End: 2024-03-01

## 2024-03-01 NOTE — TELEPHONE ENCOUNTER
Returned Mom's phone call to discuss labs. She received Usman's vm with the results already. Confirmed next appt for 3/18/24.

## 2024-03-01 NOTE — TELEPHONE ENCOUNTER
----- Message from Chelsi Padron RN sent at 3/1/2024  9:17 AM EST -----    ----- Message -----  From: Kash Mary MD  Sent: 3/1/2024   9:15 AM EST  To: [facility] Clinical Staff    Normal screening labs.  Normal thyroid studies.  Normal cortisol.  Normal vitamin D level.  Continue the current dose of levothyroxine.  Follow-up in clinic as scheduled or sooner if any concerns.  Please call family.

## 2024-03-18 ENCOUNTER — OFFICE VISIT (OUTPATIENT)
Age: 19
End: 2024-03-18
Payer: COMMERCIAL

## 2024-03-18 VITALS
WEIGHT: 189 LBS | RESPIRATION RATE: 17 BRPM | BODY MASS INDEX: 25.05 KG/M2 | DIASTOLIC BLOOD PRESSURE: 77 MMHG | HEART RATE: 77 BPM | OXYGEN SATURATION: 96 % | SYSTOLIC BLOOD PRESSURE: 118 MMHG | HEIGHT: 73 IN

## 2024-03-18 DIAGNOSIS — E03.9 HYPOTHYROIDISM (ACQUIRED): ICD-10-CM

## 2024-03-18 DIAGNOSIS — E10.9 TYPE 1 DIABETES MELLITUS WITHOUT COMPLICATIONS (HCC): Primary | ICD-10-CM

## 2024-03-18 LAB — HBA1C MFR BLD: 6.7 %

## 2024-03-18 PROCEDURE — G8420 CALC BMI NORM PARAMETERS: HCPCS | Performed by: STUDENT IN AN ORGANIZED HEALTH CARE EDUCATION/TRAINING PROGRAM

## 2024-03-18 PROCEDURE — 2022F DILAT RTA XM EVC RTNOPTHY: CPT | Performed by: STUDENT IN AN ORGANIZED HEALTH CARE EDUCATION/TRAINING PROGRAM

## 2024-03-18 PROCEDURE — G8484 FLU IMMUNIZE NO ADMIN: HCPCS | Performed by: STUDENT IN AN ORGANIZED HEALTH CARE EDUCATION/TRAINING PROGRAM

## 2024-03-18 PROCEDURE — 99215 OFFICE O/P EST HI 40 MIN: CPT | Performed by: STUDENT IN AN ORGANIZED HEALTH CARE EDUCATION/TRAINING PROGRAM

## 2024-03-18 PROCEDURE — 4004F PT TOBACCO SCREEN RCVD TLK: CPT | Performed by: STUDENT IN AN ORGANIZED HEALTH CARE EDUCATION/TRAINING PROGRAM

## 2024-03-18 PROCEDURE — 3046F HEMOGLOBIN A1C LEVEL >9.0%: CPT | Performed by: STUDENT IN AN ORGANIZED HEALTH CARE EDUCATION/TRAINING PROGRAM

## 2024-03-18 PROCEDURE — 83036 HEMOGLOBIN GLYCOSYLATED A1C: CPT | Performed by: STUDENT IN AN ORGANIZED HEALTH CARE EDUCATION/TRAINING PROGRAM

## 2024-03-18 PROCEDURE — G8427 DOCREV CUR MEDS BY ELIG CLIN: HCPCS | Performed by: STUDENT IN AN ORGANIZED HEALTH CARE EDUCATION/TRAINING PROGRAM

## 2024-03-18 ASSESSMENT — PATIENT HEALTH QUESTIONNAIRE - PHQ9
SUM OF ALL RESPONSES TO PHQ QUESTIONS 1-9: 0
SUM OF ALL RESPONSES TO PHQ9 QUESTIONS 1 & 2: 0
SUM OF ALL RESPONSES TO PHQ QUESTIONS 1-9: 0
SUM OF ALL RESPONSES TO PHQ QUESTIONS 1-9: 0
1. LITTLE INTEREST OR PLEASURE IN DOING THINGS: NOT AT ALL
SUM OF ALL RESPONSES TO PHQ QUESTIONS 1-9: 0
2. FEELING DOWN, DEPRESSED OR HOPELESS: NOT AT ALL

## 2024-03-18 NOTE — PATIENT INSTRUCTIONS
Seen for follow for type 1 diabetes.  HbA1c today is 6.7 %. Target is <7.0%.         Plan  Send us records in a week to review for any insulin dose adjustements  Review checking ketones when vomiting, 2 consecutive blood glucose above 350,  illness  When trace or small drink more water and keep checking until negative. If moderate or large give us a call #[phone]  Target before activity >120, if below get something with carbs,protein and fat (granula bar)      Yearly eye exams are recommended after you have had diabetes for 3-5 years  Dental exams every 6 months are recommended  Flu vaccine is recommended every year, as early in the season as possible  Medical ID should be worn at all times  Continue rotating injection/insertion sites  Annual labs are due: Reordered today          Insulin Instructions  Tandem CIQ   insulin lispro 100 UNIT/ML Soln injection vial (HUMALOG)   Last edited by Radha Mcdaniels, RN on 10/3/2023 at 10:49 AM      Basal Rate   Total Basal Dose: 34.05 units/day   Time units/hr   12:00 AM 1.4    4:00 AM 1.2    7:00 AM 1.45    4:00 PM 1.45    8:00 PM 1.5      Blood Glucose Target   Time mg/dL   12:00  - 110      Sensitivity Factor   Time mg/dL/unit   12:00 AM 25    4:00 AM 25    7:00 AM 25    4:00 PM 25      Carb Ratio   Time g/unit   12:00 AM 20    4:00 AM 12    7:00 AM 10    4:00 PM 10    8:00 PM 10                              - Take Levothyroxine 112 mcg daily  - Take levothyroxine on an empty stomach if possible, about 30 minutes or more prior to breakfast or 2-3 hours after a meal  - Do not take levothyroxine with other medications such as vitamins, iron or calcium supplements as iron, calcium and soy can interfere with absorption of levothyroxine.  - If [facility] misses a dose of levothyroxine, it can be made up by taking it later in the day or by taking 2 doses the following day.  - Repeat TSH and Free T4 3 months  - Return to endocrine clinic in 3 months.  - Call us sooner

## 2024-03-18 NOTE — PROGRESS NOTES
ELVIA Encounter with [facility] Sung Holland for follow up of Type 1 Diabetes. Accompanied today by mom .      Rico Atkins RD, ELVIA    Start time: 9:47 AM EDT  End Time: 9:55 am   Total time: 8 minutes spent with this clinician      Complete insulin delivery via: Tandem Control IQ  Insights from device download: Dexcom G7 with phone dahlia First [id number] right now    Time in Range (  mg/dl): 60%; 29 % high; 1.3 % low  TDD: 69.42 units       [x] Glucagon - BAQSIMI Reviewed how to use and ensure that they check the expiration date  [x] Ketones - discussed need to use with stress/illness/elevated blood sugar- less than 6 months old if opened. Need for home and school   [x] Injection sites  - ABDOMEN and POSTERIOR ARM ; Rotations of these sites Yes  Signs of Overuse to same area or lipohypertrophy: No  [x] Carb counting skills assessed including resources used - per pt does not eat often      Discussed the need for diabetes go bag that would include all diabetes management supplies, treatment for low.     Diagnosis date: 2/3/2018   Length of diabetes diagnosis: 6 years    Adult transition; graduates in a year and will wait to see where he will be lving      No results found for this or any previous visit (from the past 12 hour(s)).    Hemoglobin A1C, POC   Date Value Ref Range Status   10/03/2023 6.8 % Final   07/17/2023 7.1 % Final   04/14/2023 6.5 % Final     Hemoglobin A1C   Date Value Ref Range Status   10/03/2023 6.5 (H) 4.0 - 5.6 % Final     Comment:     (NOTE)  HbA1C Interpretive Ranges  <5.7              Normal  5.7 - 6.4         Consider Prediabetes  >6.5              Consider Diabetes     06/15/2022 7.8 (H) 4.8 - 5.6 % Final     Comment:              Prediabetes: 5.7 - 6.4           Diabetes: >6.4           Glycemic control for adults with diabetes: <7.0     07/08/2020 6.4 (H) 4.8 - 5.6 % Final     Comment:              Prediabetes: 5.7 - 6.4           Diabetes: >6.4           Glycemic control for adults 
Identified patient with two patient identifiers- name and .  Reviewed record in preparation for visit and have obtained necessary documentation.    Chief Complaint   Patient presents with    Diabetes      Magnesium supplement      
free T4.     Currently levothyroxine 112 mg daily.  We will continue current dose of levothyroxine.  Plan will be to obtain repeat thyroid labs in 3 months or sooner if new concerns.  We will give [facility] a call to discuss the results as well as further management plan.     Yearly screening labs done in October 2023 came back with lipid panel significant for improved LDL.  Normal total cholesterol and triglycerides.  Stressed the importance of continuing to make dietary and lifestyle changes.  Reduce sugary drinks, reduce carbs, reduce chips and cookies, increase vegetables, increase activity.  We will send repeat lipid panel in 6 months.  We will give family a call to discuss the results as well as further management plan.      Insufficient vitamin D level: Most recent vitamin D level done on 2/20/2024 came back normal.      Started on magnesium [over-the-counter] few weeks ago for intermittent tiredness.  Reports improvement in energy level since starting magnesium.  We discussed checking serum magnesium level.    Medical ID recommended at all times.       Plan:   Reviewed growth charts and labs with family  Reviewed hypoglycemia and how to manage hypoglycemia including when to use glucagon (for severe hypoglycemia, LOC,seizure)  Reviewed ketones check and how to management positve ketones with family  Hemoglobin A1C reviewed. Correlation between A1C and long term complications like neuropathy, nephropathy and retinopathy reviewed. Acute complications like diabetes ketoacidosis and dehydration and electrolyte abnormalities discussed  Return to clinic in 3 months or sooner if any concerns    Patient Instructions   Seen for follow for type 1 diabetes.  HbA1c today is 6.7 %. Target is <7.0%.         Plan  Send us records in a week to review for any insulin dose adjustements  Review checking ketones when vomiting, 2 consecutive blood glucose above 350,  illness  When trace or small drink more water and keep checking

## 2024-05-20 NOTE — LETTER
9/16/20 Patient: Ferannda Navarro YOB: 2005 Date of Visit: 9/16/2020 Oumar Paredes MD 
[id number] [facility] [address] [place] [address] [address] VIA Facsimile: [phone] Dear Oumar Paredes MD, Thank you for referring Mr. Christiano Webster to [address] [place] [address] for evaluation. My notes for this consultation are attached. Type 1  DM on T slim insulin pump here for follow up Also has hypothyroidism Vitamin D insufficiency frontal live children History of present illness: 
 
Wicho Virgen is a 14  y.o. 0  m.o. male with with type 1 DM here for follow up  . He was present today with his parents. Wicho Virgen was originally diagnosed with diabetes in 2/2018 when he presented in DKA to [facility] He was initially followed at [facility]. Hba1c at diagnosis was 11.1%. He was diagnosed with hypothyroidism on 4/30/18 with TSH of 11.41,freeT4 of 0.93. He was started on levothyroxine 44mcg daily. He is also s/p cholecalciferol 50,000units for vitamin D deficiency. Started T slim insulin pump on 5/7/2018. Last clinic visit was 6/1/2020(virtual). Since then he has been well with no ER visit, major illness or admission in the hospital. Had zero episodes of positive urine ketones. Denies headache,tiredness, problems with peripheral vision,constipation/diarrhea,heat/cold intolerance,polyuria,polydipsia Most recent hemoglobin A1c done on 7/8/2020 was 6.4%. He is on the Tslim and DEXCOM G6.  
 
BG average for past 2weeks: 158. See scanned chart. Hypoglycemia : None in the last week Insulin regimen: 
 
Insulin regimen: 
Basal 
                      12am 1.3 
                      4am   1.35 
                      
                      12pm  1.3 
                      7KP   1.3 
                      4BM 1.3 Total basal: 31.6units 
  
Bolus                       Target 12am 150 mg/dl                                             4am 110 mg/dl                                             12pm 110 mg/dl                                                8pm: 110 
  
                      VRW                 40NS 1:50 
                                            4am 1:30 
                                            12pm 1:30 
                                              8pm 1:30 
  
                      ICR                12am 1:40 
                                            4am  1:12 
                                              12SC  1:10 
                                              8pm  1:15 
  
  
Hypothyroidism: 
 Most recent thyroid studies: 
Results for Nathan Lovelace (MRN [id number]) as of 11/12/2019 15:24 Ref. Range 7/8/2020 T4, Free Latest Ref Range: 0.93 - 1.60 ng/dL 1.63  
TSH Latest Ref Range: 0.450 - 4.500 uIU/mL 0.468 Currently on levothyroxine 100mcg daily. Denies any symptoms of hypo/hyperthyroidism. Last Eye exam: not yet indicated Last flu shot: Last flu season Medical ID: Wearing today Screening labs: 
 
Social History: Activities: soccer(goalie) Review of systems: 
12 point ROS completed by me and is negative except as indicated above in HPI Medications: 
Current Outpatient Medications Medication Sig  cholecalciferol (VITAMIN D3) (2,000 UNITS /50 MCG) cap capsule Take 2,000 Units by mouth daily.  insulin lispro (HumaLOG U-100 Insulin) 100 unit/mL injection INFUSE VIA INSULIN PUMP UP  UNITS DAILY.  insulin glargine (BASAGLAR KWIKPEN U-100 INSULIN) 100 unit/mL (3 mL) inpn Use as directed upto 50units daily  ONETOUCH VERIO strip USED TO TEST BLOOD SUGAR UP TO 6 TIMES A DAY.  glucagon (BAQSIMI) 3 mg/actuation nasal spray BAQSIMI : two pack-3mg. Spray one device(3mg) in one nostril for severe hypoglycemia, LOC, seizures. Please label both packs.   
 levothyroxine (SYNTHROID) 100 mcg tablet TAKE 1 TAB BY MOUTH DAILY (BEFORE BREAKFAST). DO NOT TAKE WITH CA,FE OR SOY  KETOSTIX strip CHECK URINE FOR KETONES IF BLOOD SUGAR CONSISTENTLY ABOVE 300. ONE FOR HOME, ONE FOR SCHOOL.  insulin syringe-needle U-100 (BD INSULIN SYRINGE ULTRA-FINE) 0.3 mL 31 gauge x 15/64\" syrg 1-10 Units by Does Not Apply route six (6) times daily.  ONE TOUCH DELICA 33 gauge misc USE TO CHECK BLOOD SUGAR BEFORE MEALS, AT BEDTIME AND AS NEEDED  
 AJDE PEN NEEDLE 32 gauge x 5/32\" ndle USE TO ADMINSTER INSULIN UP TO 4-5 TIMES PER DAY No current facility-administered medications for this visit. Allergies: 
No Known Allergies Objective:  
 
 
Visit Vitals /67 (BP 1 Location: Right arm, BP Patient Position: Sitting) Pulse 73 Resp 22 Ht 6' 0.99\" (1.854 m) Wt 184 lb (83.5 kg) SpO2 97% BMI 24.28 kg/m² Blood pressure reading is in the normal blood pressure range based on the 2017 AAP Clinical Practice Guideline. Weight: 91 %ile (Z= 1.36) based on CDC (Boys, 2-20 Years) weight-for-age data using vitals from 9/16/2020. Height: 92 %ile (Z= 1.41) based on CDC (Boys, 2-20 Years) Stature-for-age data based on Stature recorded on 9/16/2020. BMI: Body mass index is 24.28 kg/m². Percentile: 81 %ile (Z= 0.88) based on CDC (Boys, 2-20 Years) BMI-for-age based on BMI available as of 9/16/2020. Change in weight: + 3.2 kg in 7months Change in height: +2.2cm in the last 7 months In general, oDris Zuniga is alert, well-appearing and in no acute distress. HEENT: normocephalic, atraumatic. Oropharynx is clear, mucous membranes moist. Neck is supple without lymphadenopathy. Thyroid is smooth and not enlarged. Chest: Clear to auscultation bilaterally with normal respiratory effort. CV: Normal S1/S2 without murmur. Abdomen is soft, nontender, nondistended, no hepatosplenomegaly. Skin is warm and well perfused. Injection sites:  clear without evidence of lipohypertrophy.   Neuro demonstrates normal tone and strength throughout. Sexual development: stage deferred Laboratory data: 
No components found for: QUARTERMAIN Recent Results (from the past 12 hour(s)) AMB POC HEMOGLOBIN A1C Collection Time: 09/16/20  8:39 AM  
Result Value Ref Range Hemoglobin A1c (POC) 6.6 % Office Visit on 09/16/2020 Component Date Value Ref Range Status  Hemoglobin A1c (POC) 09/16/2020 6.6  % Final  
Virtual Visit on 06/01/2020 Component Date Value Ref Range Status  Hemoglobin A1c 07/08/2020 6.4* 4.8 - 5.6 % Final  
 Comment:          Prediabetes: 5.7 - 6.4 Diabetes: >6.4 Glycemic control for adults with diabetes: <7.0  Estimated average glucose 07/08/2020 137  mg/dL Final  
 T4, Free 07/08/2020 1.63* 0.93 - 1.60 ng/dL Final  
 TSH 07/08/2020 0.468  0.450 - 4.500 uIU/mL Final  
 VITAMIN D, 25-HYDROXY 07/08/2020 16.3* 30.0 - 100.0 ng/mL Final  
 Comment: Vitamin D deficiency has been defined by the [address] [place] [address] practice guideline as a 
level of serum 25-OH vitamin D less than 20 ng/mL (1,2). The Endocrine Society went on to further define vitamin D 
insufficiency as a level between 21 and 29 ng/mL (2). 1. IOM ([place] of Medicine). 2010. Dietary reference 
   intakes for calcium and D. [facility]: The 
   [employer]. 2. Edvin MF, [place], Vicky PULIDO, et al. 
   Evaluation, treatment, and prevention of vitamin D 
   deficiency: an Endocrine Society clinical practice 
   guideline. JCEM. 2011 Jul; 96(7):1911-30. Assessment:  
 
 
Radha Matthew is a 14  y.o. 0  m.o. male presenting with recent diagnosis of type 1 diabetes under excellent control. Hba1c today 6.6 % decreased from last clinic visit and within ADA target of <7.5%. Started T-slim insulin pump on 5/7/2018. Using control IQ. BG averages almost within target.  We would make some insulin dose changes today. Send me records in 3weeks for any further insulin dose adjustment. Let me know sooner if he is having lows. Hypothyroidism: Most recent thyroid studies done on 7/8/2020 came back normal.  Continue levothyroxine 100 mcg daily. Plan to send repeat labs in 3 months or sooner if any concerns. Vitamin D deficiency status post cholecalciferol 50,000 units weekly for 6 weeks.:  We would continue with cholecalciferol 200units daily. Medical ID recommended at all times. Plan:  
Reviewed growth charts and labs with family Reviewed hypoglycemia and how to manage hypoglycemia including when to use glucagon (for severe hypoglycemia, LOC,seizure) Reviewed ketones check and how to management positve ketones with family Hemoglobin A1C reviewed. Correlation between A1C and long term complications like neuropathy, nephropathy and retinopathy reviewed. Acute complications like diabetes ketoacidosis and dehydration and electrolyte abnormalities discussed Return to clinic in 3 months or sooner if any concerns Patient Instructions Seen for follow for type 1 diabetes.  HbA1c today is 6.6%. Target is <7.5%.  
  
  
Plan Send us records in a week to review for any insulin dose adjustements Review checking ketones when vomiting, 2 consecutive blood glucose above 350,  illness When trace or small drink more water and keep checking until negative. If moderate or large give us a call #[phone] Target before activity >120, if below get something with carbs,protein and fat (granula bar)  
  Yearly eye exams are recommended after you have had diabetes for 3-5 years Dental exams every 6 months are recommended Flu vaccine is recommended every year, as early in the season as possible Medical ID should be worn at all times Continue rotating injection/insertion sites Annual labs are due: 6/2020 
  
  
New insulin regimen 
  
  
Insulin regimen: 
Basal 
                       13JO 1.5 
                      1KP   1.35 
                       
                      57IE  1.35 
                      1VG   1.3 
                      1XR 1.30 Total basal: 32units 
  
Bolus                       Target 12am 150 mg/dl                                             4am 110 mg/dl                                             12pm 110 mg/dl                                                8pm: 110 
  
                      PHA                 83EC 1:40 
                                            4am 1:30 
                                            12pm 1:30 
                                              8pm 1:30 
  
                      ICR                12am 1:40 
                                            4am  1:12 
                                               7am: 15 
                                              7YI  1:10 
                                              8pm  1:15 
  
  
Insulin duration: 4hours   
                       
  
- Take Levothyroxine 100 mcg daily - Take levothyroxine on an empty stomach if possible, about 30 minutes or more prior to breakfast or 2-3 hours after a meal 
- Do not take levothyroxine with other medications such as vitamins, iron or calcium supplements as iron, calcium and soy can interfere with absorption of levothyroxine. 
- If Nico misses a dose of levothyroxine, it can be made up by taking it later in the day or by taking 2 doses the following day. - Repeat TSH and Free T4 in 3 months. - Return to endocrine clinic in 3 months. 
- Call us sooner if Nico has symptoms of tremor, persistently rapid heart beat, diarrhea, feeling too warm all the time, difficulty sleeping or difficulty concentrating as these can be symptoms of too much thyroid hormone and we may want to repeat labs sooner than the next scheduled time.  
- Thyroid hormone needs often increase with time as children grow, gain weight, and go through puberty, so dose may need to change over time. Vitamin D deficiency: Continue cholecalciferol 2000 units daily  
 
Orders Placed This Encounter  T4, FREE Standing Status:   Future Standing Expiration Date:   2/16/2021  
 TSH 3RD GENERATION Standing Status:   Future Standing Expiration Date:   2/16/2021  AMB POC HEMOGLOBIN A1C  cholecalciferol (VITAMIN D3) (2,000 UNITS /50 MCG) cap capsule Sig: Take 2,000 Units by mouth daily. Dispense:  90 Cap Refill:  1  insulin lispro (HumaLOG U-100 Insulin) 100 unit/mL injection Sig: INFUSE VIA INSULIN PUMP UP  UNITS DAILY. Dispense:  40 mL Refill:  4 Total time: 40minutes Time spent counseling patient/family: 50% If you have questions, please do not hesitate to call me. I look forward to following your patient along with you.  
 
 
Sincerely, 
 
Jose Pritchard MD 
 
 
 4 = No assist / stand by assistance

## 2024-06-12 DIAGNOSIS — E03.9 HYPOTHYROIDISM (ACQUIRED): ICD-10-CM

## 2024-06-12 RX ORDER — LEVOTHYROXINE SODIUM 112 UG/1
112 TABLET ORAL DAILY
Qty: 90 TABLET | Refills: 1 | Status: SHIPPED | OUTPATIENT
Start: 2024-06-12

## 2024-07-15 DIAGNOSIS — E10.9 TYPE 1 DIABETES MELLITUS WITHOUT COMPLICATION (HCC): Primary | ICD-10-CM

## 2024-07-15 RX ORDER — ACYCLOVIR 400 MG/1
TABLET ORAL
Qty: 3 EACH | Refills: 3 | Status: SHIPPED | OUTPATIENT
Start: 2024-07-15

## 2024-07-15 RX ORDER — ACYCLOVIR 400 MG/1
TABLET ORAL
Qty: 3 EACH | Refills: 3 | Status: CANCELLED | OUTPATIENT
Start: 2024-07-15

## 2024-09-06 ENCOUNTER — OFFICE VISIT (OUTPATIENT)
Age: 19
End: 2024-09-06

## 2024-09-06 VITALS
WEIGHT: 199.5 LBS | HEIGHT: 73 IN | OXYGEN SATURATION: 96 % | SYSTOLIC BLOOD PRESSURE: 122 MMHG | DIASTOLIC BLOOD PRESSURE: 86 MMHG | HEART RATE: 77 BPM | BODY MASS INDEX: 26.44 KG/M2

## 2024-09-06 DIAGNOSIS — E10.9 TYPE 1 DIABETES MELLITUS WITHOUT COMPLICATION (HCC): Primary | ICD-10-CM

## 2024-09-06 DIAGNOSIS — E03.9 HYPOTHYROIDISM (ACQUIRED): ICD-10-CM

## 2024-09-06 DIAGNOSIS — E55.9 VITAMIN D DEFICIENCY: ICD-10-CM

## 2024-09-06 DIAGNOSIS — E10.9 TYPE 1 DIABETES MELLITUS WITHOUT COMPLICATION (HCC): ICD-10-CM

## 2024-09-06 LAB — HBA1C MFR BLD: 6.1 %

## 2024-09-06 RX ORDER — ACYCLOVIR 400 MG/1
TABLET ORAL
Qty: 9 EACH | Refills: 2 | Status: SHIPPED | OUTPATIENT
Start: 2024-09-06

## 2024-09-06 RX ORDER — GLUCAGON 3 MG/1
POWDER NASAL
Qty: 1 EACH | Refills: 0 | Status: SHIPPED | OUTPATIENT
Start: 2024-09-06

## 2024-09-06 RX ORDER — INSULIN LISPRO 100 [IU]/ML
INJECTION, SOLUTION INTRAVENOUS; SUBCUTANEOUS
Qty: 90 ML | Refills: 2 | Status: SHIPPED | OUTPATIENT
Start: 2024-09-06

## 2024-09-06 NOTE — PATIENT INSTRUCTIONS
Yearly screening lab today    Seen for follow for type 1 diabetes.  HbA1c today is 6.1 %. Target is <7.0%.         Plan  Send us records in a week to review for any insulin dose adjustements  Review checking ketones when vomiting, 2 consecutive blood glucose above 350,  illness  When trace or small drink more water and keep checking until negative. If moderate or large give us a call #[phone]  Target before activity >120, if below get something with carbs,protein and fat (granula bar)      Yearly eye exams are recommended after you have had diabetes for 3-5 years  Dental exams every 6 months are recommended  Flu vaccine is recommended every year, as early in the season as possible  Medical ID should be worn at all times  Continue rotating injection/insertion sites  Annual labs are due: Reordered today          Insulin Instructions  Tandem CIQ   insulin lispro 100 UNIT/ML Soln injection vial (HUMALOG,ADMELOG)   Last edited by Radha Mcdaniels RN on 10/3/2023 at 10:49 AM      Basal Rate   Total Basal Dose: 34.05 units/day   Time units/hr   12:00 AM 1.4    4:00 AM 1.2    7:00 AM 1.45    4:00 PM 1.45    8:00 PM 1.5      Blood Glucose Target   Time mg/dL   12:00  - 110      Sensitivity Factor   Time mg/dL/unit   12:00 AM 25    4:00 AM 25    7:00 AM 25    4:00 PM 25      Carb Ratio   Time g/unit   12:00 AM 20    4:00 AM 12    7:00 AM 10    4:00 PM 10    8:00 PM 10                              - Take Levothyroxine 112 mcg daily  - Take levothyroxine on an empty stomach if possible, about 30 minutes or more prior to breakfast or 2-3 hours after a meal  - Do not take levothyroxine with other medications such as vitamins, iron or calcium supplements as iron, calcium and soy can interfere with absorption of levothyroxine.  - If [facility] misses a dose of levothyroxine, it can be made up by taking it later in the day or by taking 2 doses the following day.  - Repeat TSH and Free T4  today  - Return to endocrine

## 2024-09-06 NOTE — PROGRESS NOTES
ELVIA Encounter with [facility] [place] for follow up of Type 1 Diabetes. Accompanied today by mom .      RADHA LONGO, ZOË, [facility]    Start time: 9:10 AM EDT  End Time: 9:19 AM    Total time: 9 minutes spent with this clinician    Complete insulin delivery via: Tandem Control IQ insulin pump   Insights from device download: Dexcom G7 with phone dahlia G7     Time in Range (  mg/dl): 64-66%  2% low, half hour of walking to and from campus 2x daily - uses activity mode   TDD: 69.7 units    Insulin Instructions  Tandem CIQ   insulin lispro 100 UNIT/ML Soln injection vial (HUMALOG,ADMELOG)   Last edited by Radha Longo, RN on 10/3/2023 at 10:49 AM      Basal Rate   Total Basal Dose: 34.05 units/day   Time units/hr   12:00 AM 1.4    4:00 AM 1.2    7:00 AM 1.45    4:00 PM 1.45    8:00 PM 1.5      Blood Glucose Target   Time mg/dL   12:00  - 110      Sensitivity Factor   Time mg/dL/unit   12:00 AM 25    4:00 AM 25    7:00 AM 25    4:00 PM 25      Carb Ratio   Time g/unit   12:00 AM 20    4:00 AM 12    7:00 AM 10    4:00 PM 10    8:00 PM 10        [x] Glucagon - BAQSIMI Reviewed how to use and ensure that they check the expiration date  [x] Ketones - discussed need to use with stress/illness/elevated blood sugar- less than 6 months old if opened. Need for home and school   [x] Injection sites  - ABDOMEN ; Rotations of these sites --yes  Signs of Overuse to same area or lipohypertrophy: No  [x] Carb counting skills assessed including resources used - better premeal dosing recognition       has diabetes go bag that would include all diabetes management supplies, treatment for low.     Diagnosis date: 2/3/2018  Length of diabetes diagnosis: 6 years      Hemoglobin A1C, POC   Date Value Ref Range Status   09/06/2024 6.1 % Final   03/18/2024 6.7 % Final   10/03/2023 6.8 % Final     Hemoglobin A1C   Date Value Ref Range Status   10/03/2023 6.5 (H) 4.0 - 5.6 % Final     Comment:     (NOTE)  HbA1C

## 2024-09-07 LAB
25(OH)D3+25(OH)D2 SERPL-MCNC: 49.5 NG/ML (ref 30–100)
ALBUMIN/CREAT UR: 2 MG/G CREAT (ref 0–29)
CHOLEST SERPL-MCNC: 194 MG/DL (ref 100–199)
CREAT UR-MCNC: 148.3 MG/DL
HBA1C MFR BLD: 6.3 % (ref 4.8–5.6)
HDLC SERPL-MCNC: 44 MG/DL
IGA SERPL-MCNC: 131 MG/DL (ref 90–386)
IMP & REVIEW OF LAB RESULTS: NORMAL
LDLC SERPL CALC-MCNC: 118 MG/DL (ref 0–99)
MICROALBUMIN UR-MCNC: 3.5 UG/ML
T4 FREE SERPL-MCNC: 1.52 NG/DL (ref 0.82–1.77)
TRIGL SERPL-MCNC: 182 MG/DL (ref 0–149)
TSH SERPL DL<=0.005 MIU/L-ACNC: 5.76 UIU/ML (ref 0.45–4.5)
TTG IGA SER-ACNC: <2 U/ML (ref 0–3)
VLDLC SERPL CALC-MCNC: 32 MG/DL (ref 5–40)

## 2024-11-26 ENCOUNTER — TELEPHONE (OUTPATIENT)
Age: 19
End: 2024-11-26

## 2024-12-06 ENCOUNTER — OFFICE VISIT (OUTPATIENT)
Age: 19
End: 2024-12-06

## 2024-12-06 VITALS
TEMPERATURE: 98 F | HEIGHT: 73 IN | BODY MASS INDEX: 25.63 KG/M2 | OXYGEN SATURATION: 100 % | DIASTOLIC BLOOD PRESSURE: 85 MMHG | RESPIRATION RATE: 16 BRPM | WEIGHT: 193.4 LBS | HEART RATE: 91 BPM | SYSTOLIC BLOOD PRESSURE: 120 MMHG

## 2024-12-06 DIAGNOSIS — E10.9 TYPE 1 DIABETES MELLITUS WITHOUT COMPLICATION (HCC): Primary | ICD-10-CM

## 2024-12-06 DIAGNOSIS — E03.9 HYPOTHYROIDISM (ACQUIRED): ICD-10-CM

## 2024-12-06 DIAGNOSIS — E55.9 VITAMIN D INSUFFICIENCY: ICD-10-CM

## 2024-12-06 DIAGNOSIS — E10.9 TYPE 1 DIABETES MELLITUS WITHOUT COMPLICATION (HCC): ICD-10-CM

## 2024-12-06 LAB — HBA1C MFR BLD: 5.8 %

## 2024-12-06 ASSESSMENT — PATIENT HEALTH QUESTIONNAIRE - PHQ9
SUM OF ALL RESPONSES TO PHQ QUESTIONS 1-9: 0
1. LITTLE INTEREST OR PLEASURE IN DOING THINGS: NOT AT ALL
SUM OF ALL RESPONSES TO PHQ QUESTIONS 1-9: 0
SUM OF ALL RESPONSES TO PHQ QUESTIONS 1-9: 0
SUM OF ALL RESPONSES TO PHQ9 QUESTIONS 1 & 2: 0
SUM OF ALL RESPONSES TO PHQ QUESTIONS 1-9: 0
2. FEELING DOWN, DEPRESSED OR HOPELESS: NOT AT ALL

## 2024-12-06 NOTE — PROGRESS NOTES
Type 1  DM on T slim insulin pump here for follow up  Also has hypothyroidism   Vitamin D deficiency    History of present illness:    Robb is a 19y.o. male with with type 1 DM here for follow up  . He was present today alone    Robb was originally diagnosed with diabetes in 2/2018 when he presented in DKA to [facility] He was initially followed at [facility].  Hba1c at diagnosis was 11.1%.  He was diagnosed with hypothyroidism on 4/30/18 with TSH of 11.41,freeT4 of 0.93. He was started on levothyroxine 44mcg daily. He is also s/p cholecalciferol 50,000units for vitamin D deficiency. Started T slim insulin pump on 5/7/2018.     Last clinic visit was 9/6/24 and Hba1c was 6.3%.  Since then, he has been well with no ER visit, major illness or admission in the hospital. Had zero episodes of positive urine ketones.   Denies headache,problems with peripheral vision,constipation,heat/cold intolerance,polyuria,polydipsia    He is on the Tslim and DEXCOM G6.     BG average for past 2weeks: 151.  Time in range: 66%, high: 18%, very high: 10%, low: 4.9%, very low: 1.2%     Hypoglycemia : About 1-2 times a week mostly of activity.    Insulin regimen:    Insulin regimen:    Tandem CIQ   insulin lispro 100 UNIT/ML Soln injection vial (HUMALOG,ADMELOG)   Last edited by Kash Mary MD on 12/6/2024 at 8:50 AM      Basal Rate   Total Basal Dose: 33.25 units/day   Time units/hr   12:00 AM 1.4    4:00 AM 1.2    7:00 AM 1.45    4:00 PM 1.45    8:00 PM 1.5      Blood Glucose Target   Time mg/dL   12:00  - 110      Sensitivity Factor   Time mg/dL/unit   12:00 AM 25    4:00 AM 25    7:00 AM 25    4:00 PM 25      Carb Ratio   Time g/unit   12:00 AM 20    4:00 AM 12    7:00 AM 10    4:00 PM 10    8:00 PM 10           Hypothyroidism:   Most recent thyroid studies:  Lab Results   Component Value Date    TSH 5.760 (H) 09/06/2024    T4FREE 1.52 09/06/2024     Currently on levothyroxine 112mcg daily    Office

## 2024-12-06 NOTE — PROGRESS NOTES
ELVIA Encounter with [facility] [place] for follow up of Type 1 Diabetes. Accompanied today by self.      FAIZAN MORLEY RD, [place]    Start time: 8:25 AM EST  End Time: 8:35 AM EST  Total time: 10 minutes spent with this clinician      Complete insulin delivery via: Tandem Control IQ insulin pump   Insights from device download: Dexcom G7 with phone dahlia    Pt walks 1/2 hour to and from campus every day and even with activity mode still goes low. Has not used in past two weeks .  Per pt he does not treat over night low BS as they resolve on their own     Pt is trying to finish to graduate but taking a lot of classes so stress up.  6 this semester and 6 next semester.     Pt is concerned with rise in BS every morning despite no food intake.       Time in Range (  mg/dl): 66% %, 18% high, 10 % very high, 4.9% low, 1.2% very low  TDD: 62.86 units     Insulin Instructions  Tandem CIQ   insulin lispro 100 UNIT/ML Soln injection vial (HUMALOG,ADMELOG)   Last edited by Radha Mcdaniels, RN on 10/3/2023 at 10:49 AM      Basal Rate   Total Basal Dose: 34.05 units/day   Time units/hr   12:00 AM 1.4    4:00 AM 1.2    7:00 AM 1.45    4:00 PM 1.45    8:00 PM 1.5      Blood Glucose Target   Time mg/dL   12:00  - 110      Sensitivity Factor   Time mg/dL/unit   12:00 AM 25    4:00 AM 25    7:00 AM 25    4:00 PM 25      Carb Ratio   Time g/unit   12:00 AM 20    4:00 AM 12    7:00 AM 10    4:00 PM 10    8:00 PM 10          Diagnosis date: 2/2018   Length of diabetes diagnosis: 6.5 years          Recent Results (from the past 12 hour(s))   AMB POC HEMOGLOBIN A1C    Collection Time: 12/06/24  8:26 AM   Result Value Ref Range    Hemoglobin A1C, POC 5.8 %       Hemoglobin A1C, POC   Date Value Ref Range Status   12/06/2024 5.8 % Final   09/06/2024 6.1 % Final   03/18/2024 6.7 % Final     Hemoglobin A1C   Date Value Ref Range Status   09/06/2024 6.3 (H) 4.8 - 5.6 % Final     Comment:                 Prediabetes: 5.7 -

## 2024-12-06 NOTE — PATIENT INSTRUCTIONS
months.  - Call us sooner if [facility] has symptoms of tremor, persistently rapid heart beat, diarrhea, feeling too warm all the time, difficulty sleeping or difficulty concentrating as these can be symptoms of too much thyroid hormone and we may want to repeat labs sooner than the next scheduled time.  - Thyroid hormone needs often increase with time as children grow, gain weight, and go through puberty, so dose may need to change over time.

## 2024-12-07 LAB
25(OH)D3+25(OH)D2 SERPL-MCNC: 60.2 NG/ML (ref 30–100)
CHOLEST SERPL-MCNC: 153 MG/DL (ref 100–199)
HDLC SERPL-MCNC: 38 MG/DL
IMP & REVIEW OF LAB RESULTS: NORMAL
LDLC SERPL CALC-MCNC: 89 MG/DL (ref 0–99)
T4 FREE SERPL-MCNC: 1.18 NG/DL (ref 0.82–1.77)
TRIGL SERPL-MCNC: 149 MG/DL (ref 0–149)
TSH SERPL DL<=0.005 MIU/L-ACNC: 3.77 UIU/ML (ref 0.45–4.5)
VLDLC SERPL CALC-MCNC: 26 MG/DL (ref 5–40)

## 2024-12-14 DIAGNOSIS — E10.9 TYPE 1 DIABETES MELLITUS WITHOUT COMPLICATION (HCC): ICD-10-CM

## 2024-12-16 RX ORDER — ACYCLOVIR 400 MG/1
TABLET ORAL
Qty: 3 EACH | Refills: 3 | Status: SHIPPED | OUTPATIENT
Start: 2024-12-16

## 2024-12-27 DIAGNOSIS — E03.9 HYPOTHYROIDISM (ACQUIRED): ICD-10-CM

## 2024-12-27 RX ORDER — LEVOTHYROXINE SODIUM 112 UG/1
TABLET ORAL
Qty: 90 TABLET | Refills: 1 | Status: SHIPPED | OUTPATIENT
Start: 2024-12-27

## 2025-02-06 DIAGNOSIS — E10.9 TYPE 1 DIABETES MELLITUS WITHOUT COMPLICATION (HCC): ICD-10-CM

## 2025-02-06 RX ORDER — ACYCLOVIR 400 MG/1
TABLET ORAL
Qty: 9 EACH | Refills: 1 | Status: SHIPPED | OUTPATIENT
Start: 2025-02-06

## 2025-02-06 NOTE — TELEPHONE ENCOUNTER
[facility] called to report that he is having trouble getting Dexcom G6 refilled, at last fill on got a 30 days supply but requested 90 day. Now not able to get a refill and is out of sensors.  Going to [facility] on file in [place].    Please advise when completed [phone]

## 2025-03-14 ENCOUNTER — TELEPHONE (OUTPATIENT)
Age: 20
End: 2025-03-14

## 2025-03-14 NOTE — TELEPHONE ENCOUNTER
Left message on voicemail at both numbers on file advising the [facility] [employer]PO Connect plan does not participate with [facility]. (Card uploaded in chart on 3/14/25) If chooses to keep appointment on 3/17/25, it will be Self Pay.

## 2025-03-17 ENCOUNTER — OFFICE VISIT (OUTPATIENT)
Age: 20
End: 2025-03-17
Payer: COMMERCIAL

## 2025-03-17 VITALS
BODY MASS INDEX: 25.5 KG/M2 | HEART RATE: 73 BPM | HEIGHT: 73 IN | WEIGHT: 192.4 LBS | DIASTOLIC BLOOD PRESSURE: 82 MMHG | RESPIRATION RATE: 17 BRPM | OXYGEN SATURATION: 98 % | SYSTOLIC BLOOD PRESSURE: 119 MMHG

## 2025-03-17 DIAGNOSIS — E10.65 TYPE 1 DIABETES MELLITUS WITH HYPERGLYCEMIA (HCC): Primary | ICD-10-CM

## 2025-03-17 DIAGNOSIS — E03.9 HYPOTHYROIDISM (ACQUIRED): ICD-10-CM

## 2025-03-17 LAB — HBA1C MFR BLD: 6.4 %

## 2025-03-17 PROCEDURE — 99215 OFFICE O/P EST HI 40 MIN: CPT | Performed by: STUDENT IN AN ORGANIZED HEALTH CARE EDUCATION/TRAINING PROGRAM

## 2025-03-17 PROCEDURE — 83036 HEMOGLOBIN GLYCOSYLATED A1C: CPT | Performed by: STUDENT IN AN ORGANIZED HEALTH CARE EDUCATION/TRAINING PROGRAM

## 2025-03-17 PROCEDURE — 3044F HG A1C LEVEL LT 7.0%: CPT | Performed by: STUDENT IN AN ORGANIZED HEALTH CARE EDUCATION/TRAINING PROGRAM

## 2025-03-17 PROCEDURE — 95251 CONT GLUC MNTR ANALYSIS I&R: CPT | Performed by: STUDENT IN AN ORGANIZED HEALTH CARE EDUCATION/TRAINING PROGRAM

## 2025-03-17 ASSESSMENT — PATIENT HEALTH QUESTIONNAIRE - PHQ9
SUM OF ALL RESPONSES TO PHQ QUESTIONS 1-9: 0
SUM OF ALL RESPONSES TO PHQ QUESTIONS 1-9: 0
2. FEELING DOWN, DEPRESSED OR HOPELESS: NOT AT ALL
SUM OF ALL RESPONSES TO PHQ QUESTIONS 1-9: 0
1. LITTLE INTEREST OR PLEASURE IN DOING THINGS: NOT AT ALL
SUM OF ALL RESPONSES TO PHQ QUESTIONS 1-9: 0

## 2025-03-17 NOTE — PATIENT INSTRUCTIONS
months.  - Call us sooner if [facility] has symptoms of tremor, persistently rapid heart beat, diarrhea, feeling too warm all the time, difficulty sleeping or difficulty concentrating as these can be symptoms of too much thyroid hormone and we may want to repeat labs sooner than the next scheduled time.  - Thyroid hormone needs often increase with time as children grow, gain weight, and go through puberty, so dose may need to change over time.

## 2025-03-17 NOTE — PROGRESS NOTES
ELVIA Encounter with [facility]ung [place] for follow up of Type 1 Diabetes. Accompanied today by mom .      FAIZAN MORLEY RD, ELVIA    Start time: 8:20 AM EDT  End Time: 8:26 AM EDT    Total time: 6 minutes spent with this clinician      Complete insulin delivery via: Tandem Control IQ insulin pump --- Auto Soft 90 6 mm infusion set   Insights from device download: Dexcom G7 with phone dahlia    No sensors January to late February     Very High (Above 250 mg/dL): 7 %  High (181-249 mg/dl): 14 %  Time in Range (  mg/dl): 75 %  Low (55- 69 mg/dl): 3.6 %  Very Low (Below 54 mg/dL): 1.3 %    TDD/TDI: 58.05 units     Insulin Instructions  Tandem CIQ   insulin lispro 100 UNIT/ML Soln injection vial (HUMALOG,ADMELOG)   Last edited by Kash Mary MD on 12/6/2024 at 8:50 AM      Basal Rate   Total Basal Dose: 33.25 units/day   Time units/hr   12:00 AM 1.3    4:00 AM 1.2    7:00 AM 1.45    4:00 PM 1.45    8:00 PM 1.4      Blood Glucose Target   Time mg/dL   12:00  - 110      Sensitivity Factor   Time mg/dL/unit   12:00 AM 25    4:00 AM 25    7:00 AM 25    4:00 PM 25      Carb Ratio   Time g/unit   12:00 AM 12    4:00 AM 12    7:00 AM 12    4:00 PM 12    8:00 PM 12        Diagnosis date: 2/2018   Length of diabetes diagnosis: 7 years         Discussed adult transition. Does not know where he will be going [place] or [place]. Discussed he can not see him after age 20 technically      Recent Results (from the past 12 hours)   AMB POC HEMOGLOBIN A1C    Collection Time: 03/17/25  8:19 AM   Result Value Ref Range    Hemoglobin A1C, POC 6.4 %       Hemoglobin A1C, POC   Date Value Ref Range Status   03/17/2025 6.4 % Final   12/06/2024 5.8 % Final   09/06/2024 6.1 % Final     Hemoglobin A1C   Date Value Ref Range Status   09/06/2024 6.3 (H) 4.8 - 5.6 % Final     Comment:                 Prediabetes: 5.7 - 6.4           Diabetes: >6.4           Glycemic control for adults with diabetes: <7.0     10/03/2023 6.5 (H) 4.0 
Identified patient with two patient identifiers- name and .  Reviewed record in preparation for visit and have obtained necessary documentation.    Chief Complaint   Patient presents with    Diabetes      /82   Pulse 73   Resp 17   Ht 1.863 m (6' 1.35\")   Wt 87.3 kg (192 lb 6.4 oz)   SpO2 98%   BMI 25.15 kg/m²       
°F (36.7 °C) (Oral)     BP Readings from Last 3 Encounters:   03/17/25 119/82   12/06/24 120/85   09/06/24 122/86     Pulse Readings from Last 3 Encounters:   03/17/25 73   12/06/24 91   09/06/24 77           In general, Robb is alert, well-appearing and in no acute distress.  Oropharynx is clear, mucous membranes moist. Neck is supple without lymphadenopathy. Thyroid is smooth and not enlarged.  Abdomen is soft, nontender, nondistended, no hepatosplenomegaly. Skin is warm and well perfused.  Infusion sites:  clear without evidence of lipohypertrophy.  Neuro demonstrates normal tone and strength throughout. Sexual development: stage [adult]    Laboratory data:          Office Visit on 04/14/2023   Component Date Value Ref Range Status    Hemoglobin A1c (POC) 04/14/2023 6.5  % Final   Office Visit on 01/03/2023   Component Date Value Ref Range Status    Hemoglobin A1c (POC) 01/03/2023 7.2  % Final    Cortisol free, ug/L 01/20/2023 7  Undefined ug/L Final    Total Volume: 2000 mL    Cortisol free, ug/24 hr 01/20/2023 14  5 - 64 ug/24 hr Final   Patient Message on 12/13/2022   Component Date Value Ref Range Status    Immunoglobulin A, Qt. 01/18/2023 122  90 - 386 mg/dL Final    Deamidated Gliadin Ab, IgA 01/18/2023 4  0 - 19 units Final    Comment:                    Negative                   0 - 19                     Weak Positive             20 - 30                     Moderate to Strong Positive   >30      Deamidated Gliadin Ab, IgG 01/18/2023 4  0 - 19 units Final    Comment:                    Negative                   0 - 19                     Weak Positive             20 - 30                     Moderate to Strong Positive   >30      t-Transglutaminase, IgA 01/18/2023 <2  0 - 3 U/mL Final    Comment:                               Negative        0 -  3                                Weak Positive   4 - 10                                Positive           >10   Tissue Transglutaminase (tTG) has been

## 2025-03-18 LAB
T4 FREE SERPL-MCNC: 1.07 NG/DL (ref 0.82–1.77)
TSH SERPL DL<=0.005 MIU/L-ACNC: 9.85 UIU/ML (ref 0.45–4.5)

## 2025-03-26 ENCOUNTER — RESULTS FOLLOW-UP (OUTPATIENT)
Age: 20
End: 2025-03-26

## 2025-03-26 DIAGNOSIS — E03.9 HYPOTHYROIDISM (ACQUIRED): Primary | ICD-10-CM

## 2025-03-26 RX ORDER — LEVOTHYROXINE SODIUM 125 UG/1
125 TABLET ORAL
Qty: 90 TABLET | Refills: 0 | Status: SHIPPED | OUTPATIENT
Start: 2025-03-26

## 2025-03-26 NOTE — TELEPHONE ENCOUNTER
Thyroid labs came back of mildly elevated TSH with low normal free T4.  We will increase levothyroxine dose from 112 mcg daily to 125 mcg daily.  Plan for repeat labs in 2 months or sooner if any concerns.  Called and reviewed the results as well as management plan with the lab who verbalized understanding.

## 2025-05-26 DIAGNOSIS — E03.9 HYPOTHYROIDISM (ACQUIRED): ICD-10-CM

## 2025-06-17 ENCOUNTER — OFFICE VISIT (OUTPATIENT)
Age: 20
End: 2025-06-17
Payer: COMMERCIAL

## 2025-06-17 VITALS
TEMPERATURE: 98.4 F | OXYGEN SATURATION: 96 % | SYSTOLIC BLOOD PRESSURE: 121 MMHG | WEIGHT: 199.4 LBS | HEART RATE: 76 BPM | DIASTOLIC BLOOD PRESSURE: 75 MMHG | RESPIRATION RATE: 20 BRPM | HEIGHT: 73 IN | BODY MASS INDEX: 26.43 KG/M2

## 2025-06-17 DIAGNOSIS — E03.9 HYPOTHYROIDISM (ACQUIRED): ICD-10-CM

## 2025-06-17 DIAGNOSIS — E10.65 TYPE 1 DIABETES MELLITUS WITH HYPERGLYCEMIA (HCC): Primary | ICD-10-CM

## 2025-06-17 LAB — HBA1C MFR BLD: 6.1 %

## 2025-06-17 PROCEDURE — 83036 HEMOGLOBIN GLYCOSYLATED A1C: CPT | Performed by: STUDENT IN AN ORGANIZED HEALTH CARE EDUCATION/TRAINING PROGRAM

## 2025-06-17 PROCEDURE — 3044F HG A1C LEVEL LT 7.0%: CPT | Performed by: STUDENT IN AN ORGANIZED HEALTH CARE EDUCATION/TRAINING PROGRAM

## 2025-06-17 PROCEDURE — 99215 OFFICE O/P EST HI 40 MIN: CPT | Performed by: STUDENT IN AN ORGANIZED HEALTH CARE EDUCATION/TRAINING PROGRAM

## 2025-06-17 PROCEDURE — 95251 CONT GLUC MNTR ANALYSIS I&R: CPT | Performed by: STUDENT IN AN ORGANIZED HEALTH CARE EDUCATION/TRAINING PROGRAM

## 2025-06-17 ASSESSMENT — PATIENT HEALTH QUESTIONNAIRE - PHQ9
SUM OF ALL RESPONSES TO PHQ QUESTIONS 1-9: 0
1. LITTLE INTEREST OR PLEASURE IN DOING THINGS: NOT AT ALL
2. FEELING DOWN, DEPRESSED OR HOPELESS: NOT AT ALL

## 2025-06-17 NOTE — PROGRESS NOTES
Chief Complaint   Patient presents with    Follow-up     t1d      
ELVIA Encounter with [facility] [place] for follow up of Type 1 Diabetes. Accompanied today by mom .      FAIZAN MORLEY RD, [facility]    Start time: 10:55 AM EDT  End Time: 11:03 AM EDT    Total time: 8 minutes spent with this clinician      Complete insulin delivery via: Tandem Control IQ insulin pump --- auto soft 90  infusion set   Insights from device download: Dexcom G7 with phone dahlia    Very High (Above 250 mg/dL): 12 %  High (181-249 mg/dl): 19 %  Time in Range (  mg/dl): 64 %  Low (55- 69 mg/dl): 3.7 %  Very Low (Below 54 mg/dL): 0.6 %    TDD/TDI: 61.76 units     Changing every 3.7 days     Has graduated and job hunting.  Looking in [place] and [place] since his partner is in the film industry. Also looking at MD.      Insulin Instructions  Tandem CIQ   insulin lispro 100 UNIT/ML Soln injection vial (HUMALOG,ADMELOG)   Last edited by Kash Mary MD on 2024 at 8:50 AM      Basal Rate   Total Basal Dose: 33.25 units/day   Time units/hr   12:00 AM 1.3    4:00 AM 1.2    7:00 AM 1.45    4:00 PM 1.45    8:00 PM 1.4      Blood Glucose Target   Time mg/dL   12:00  - 110      Sensitivity Factor   Time mg/dL/unit   12:00 AM 25    4:00 AM 25    7:00 AM 25    4:00 PM 25      Carb Ratio   Time g/unit   12:00 AM 12    4:00 AM 12    7:00 AM 12    4:00 PM 12    8:00 PM 12        [x] Glucagon - BAQSIMI Reviewed how to use and ensure that they check the expiration date  [] Ketones - discussed need to use with stress/illness/elevated blood sugar- less than 6 months old if opened.  Has an unopened bottle    Has a back up meter and lantus in the fridge that is not .       Diagnosis date: 2018   Length of diabetes diagnosis: 7 years        Preparing for Adult Transition Checklist:     Ages 19-20+    Patient expressed confidence in the following:  [x] I have signed a parental release of information for this clinic.  [x] I know the names and reasons I take my medications/ use supplies   [x] I know who to call 
later in the day or by taking 2 doses the following day.  - Repeat TSH and Free T4  today  - Return to endocrine clinic in 3 months.  - Call us sooner if [facility] has symptoms of tremor, persistently rapid heart beat, diarrhea, feeling too warm all the time, difficulty sleeping or difficulty concentrating as these can be symptoms of too much thyroid hormone and we may want to repeat labs sooner than the next scheduled time.  - Thyroid hormone needs often increase with time as children grow, gain weight, and go through puberty, so dose may need to change over time.  Orders Placed This Encounter   Procedures    TSH     Standing Status:   Future     Expected Date:   6/17/2025     Expiration Date:   6/17/2026    T4, Free     Standing Status:   Future     Expected Date:   6/17/2025     Expiration Date:   6/17/2026    AMB POC HEMOGLOBIN A1C       Total time: 40minutes  Time spent counseling patient/family: 50%    Parts of these notes were done by Dragon dictation and may be subject to inadvertent grammatical errors due to issues of voice recognition.      Kash Mary MD

## 2025-06-17 NOTE — PATIENT INSTRUCTIONS
Labs done today    Seen for follow for type 1 diabetes.  HbA1c today is 6.1%. Target is <7.0%.         Plan  Send us records in a week to review for any insulin dose adjustements  Review checking ketones when vomiting, 2 consecutive blood glucose above 350,  illness  When trace or small drink more water and keep checking until negative. If moderate or large give us a call #[phone]  Target before activity >120, if below get something with carbs,protein and fat (granula bar)      Yearly eye exams are recommended after you have had diabetes for 3-5 years  Dental exams every 6 months are recommended  Flu vaccine is recommended every year, as early in the season as possible  Medical ID should be worn at all times  Continue rotating injection/insertion sites  Annual labs are due: thyroid labs today          Insulin Instructions  Tandem CIQ   insulin lispro 100 UNIT/ML Soln injection vial (HUMALOG,ADMELOG)   Last edited by Kash Mary MD on 12/6/2024 at 8:50 AM      Basal Rate   Total Basal Dose: 33.25 units/day   Time units/hr   12:00 AM 1.3    4:00 AM 1.2    7:00 AM 1.45    4:00 PM 1.45    8:00 PM 1.4      Blood Glucose Target   Time mg/dL   12:00  - 110      Sensitivity Factor   Time mg/dL/unit   12:00 AM 25    4:00 AM 25    7:00 AM 25    4:00 PM 25      Carb Ratio   Time g/unit   12:00 AM 12    4:00 AM 12    7:00 AM 12    4:00 PM 12    8:00 PM 12                              - Take Levothyroxine 112 mcg daily  - Take levothyroxine on an empty stomach if possible, about 30 minutes or more prior to breakfast or 2-3 hours after a meal  - Do not take levothyroxine with other medications such as vitamins, iron or calcium supplements as iron, calcium and soy can interfere with absorption of levothyroxine.  - If [facility] misses a dose of levothyroxine, it can be made up by taking it later in the day or by taking 2 doses the following day.  - Repeat TSH and Free T4  today  - Return to endocrine clinic in 3

## 2025-06-18 LAB
T4 FREE SERPL-MCNC: 1.1 NG/DL (ref 0.82–1.77)
TSH SERPL DL<=0.005 MIU/L-ACNC: 6.67 UIU/ML (ref 0.45–4.5)

## 2025-06-23 ENCOUNTER — RESULTS FOLLOW-UP (OUTPATIENT)
Age: 20
End: 2025-06-23

## 2025-06-24 DIAGNOSIS — E03.9 HYPOTHYROIDISM (ACQUIRED): ICD-10-CM

## 2025-06-27 ENCOUNTER — TELEPHONE (OUTPATIENT)
Age: 20
End: 2025-06-27

## 2025-06-27 DIAGNOSIS — E03.9 HYPOTHYROIDISM (ACQUIRED): Primary | ICD-10-CM

## 2025-06-27 RX ORDER — LEVOTHYROXINE SODIUM 137 UG/1
137 TABLET ORAL
Qty: 90 TABLET | Refills: 1 | Status: SHIPPED | OUTPATIENT
Start: 2025-06-27

## 2025-06-27 RX ORDER — LEVOTHYROXINE SODIUM 125 UG/1
125 TABLET ORAL
Qty: 90 TABLET | Refills: 0 | OUTPATIENT
Start: 2025-06-27

## 2025-06-27 NOTE — TELEPHONE ENCOUNTER
The patient called to let Dr Mary know that the medication dose change for the Levothyroxine is okay.    [phone]

## 2025-06-27 NOTE — TELEPHONE ENCOUNTER
Called and spoke to [facility].  Reviewed lab results.  Will increase levothyroxine dose to 137 mcg daily.  Plan for repeat labs in 2 months or sooner if any concerns. [facility] verbalized understanding of plan.

## 2025-08-27 DIAGNOSIS — E03.9 HYPOTHYROIDISM (ACQUIRED): ICD-10-CM
